# Patient Record
Sex: FEMALE | Race: WHITE | URBAN - METROPOLITAN AREA
[De-identification: names, ages, dates, MRNs, and addresses within clinical notes are randomized per-mention and may not be internally consistent; named-entity substitution may affect disease eponyms.]

---

## 2017-02-02 ENCOUNTER — TRANSFERRED RECORDS (OUTPATIENT)
Dept: HEALTH INFORMATION MANAGEMENT | Facility: CLINIC | Age: 57
End: 2017-02-02

## 2017-02-08 ENCOUNTER — TRANSFERRED RECORDS (OUTPATIENT)
Dept: HEALTH INFORMATION MANAGEMENT | Facility: CLINIC | Age: 57
End: 2017-02-08

## 2017-02-14 ENCOUNTER — TRANSFERRED RECORDS (OUTPATIENT)
Dept: HEALTH INFORMATION MANAGEMENT | Facility: CLINIC | Age: 57
End: 2017-02-14

## 2017-02-22 ENCOUNTER — OFFICE VISIT (OUTPATIENT)
Dept: NEUROSURGERY | Facility: CLINIC | Age: 57
End: 2017-02-22
Attending: NURSE PRACTITIONER
Payer: COMMERCIAL

## 2017-02-22 VITALS
TEMPERATURE: 98.8 F | WEIGHT: 197 LBS | SYSTOLIC BLOOD PRESSURE: 123 MMHG | HEART RATE: 82 BPM | DIASTOLIC BLOOD PRESSURE: 83 MMHG | HEIGHT: 63 IN | OXYGEN SATURATION: 96 % | BODY MASS INDEX: 34.91 KG/M2

## 2017-02-22 DIAGNOSIS — M54.16 LUMBAR RADICULOPATHY: Primary | ICD-10-CM

## 2017-02-22 PROCEDURE — 99211 OFF/OP EST MAY X REQ PHY/QHP: CPT

## 2017-02-22 PROCEDURE — 99203 OFFICE O/P NEW LOW 30 MIN: CPT | Performed by: NURSE PRACTITIONER

## 2017-02-22 NOTE — NURSING NOTE
Evelia Conner is a 56 year old female who presents for:  Chief Complaint   Patient presents with     Neurologic Problem     buldging lumbar disc, low back pain with left leg numbness        Initial Vitals:  There were no vitals taken for this visit. There is no height or weight on file to calculate BMI.. There is no height or weight on file to calculate BSA. BP completed using cuff size: large  Data Unavailable    Do you feel safe in your environment?  Yes  Do you need any refills today? No    Nursing Comments: buldging lumbar disc, low back pain with left leg numbness.  Patient rates her pain today as 4 numbness, weakness, tingling and pain in the back, hips and thigh on left side      5 min. nursing intake time  Marline Crabtree CMA, AAS      Discharge plan:   Schedule lumbar epidural with [facility]- someone will contact you within 48 hours  [facility] after injection to follow up with Dr. Betts- [phone]      2 min. nursing discharge time  Marline Crabtree CMA, AAS

## 2017-02-22 NOTE — MR AVS SNAPSHOT
After Visit Summary   2/22/2017    Evelia Conner    MRN: [id number]           Patient Information     Date Of Birth          1960        Visit Information        Provider Department      2/22/2017 1:10 PM Anne-Marie Dalal NP [facility]        Today's Diagnoses     Lumbar radiculopathy    -  1      Care Instructions    Schedule lumbar epidural with [facility]- someone will contact you within 48 hours  [facility] after injection to follow up with Dr. Betts- [phone]          Follow-ups after your visit        Additional Services     [facility] ([place]) REFERRAL       Your provider has referred you to the [facility].    Reason for Referral: Procedure or injection only - patient will be contacted within 24 hrs to schedule: left L3 transforaminal epidural steroid injection     Please complete the following questions:    What is your diagnosis for the patient's pain? Lumbar radiculopathy    Do you have any specific questions for the pain specialist? No    Are there any red flags that may impact the assessment or management of the patient? None    **ANY DIAGNOSTIC TESTS THAT ARE NOT IN EPIC SHOULD BE SENT TO THE PAIN CENTER**    Please note the Pre-Op Pain Consult must be scheduled 2-3 weeks prior to the patient's surgery.  Patient's trying to schedule within 2 weeks of surgery may not be accommodated.     Pre-Op Pain Consults are only good for 30 days.    REGARDING OPIOID MEDICATIONS:  We will always address appropriateness of opioid pain medications, but we generally will not automatically take on a prescribing role. When we do take on prescribing of opioids for chronic pain, it is in collaboration with the referring physician for an intermediate period of time (months), with an expectation that the primary physician or provider will assume the prescribing role if  "medications are effective at stable doses with demonstrated compliance.  Therefore, please do not assume that your prescribing responsibilities end on the day of pain clinic consultation.  Is this agreeable to you? YES    For any questions, contact the [facility] at [phone].    Please be aware that coverage of these services is subject to the terms and limitations of your health insurance plan.  Call member services at your health plan with any benefit or coverage questions.      Please bring the following with you to your appointment:    (1) Any X-Rays, CTs or MRIs which have been performed.  Contact the facility where they were done to arrange for  prior to your scheduled appointment.    (2) List of current medications   (3) This referral request   (4) Any documents/labs given to you for this referral                  Who to contact     If you have questions or need follow up information about today's clinic visit or your schedule please contact [facility] directly at [phone].  Normal or non-critical lab and imaging results will be communicated to you by MyChart, letter or phone within 4 business days after the clinic has received the results. If you do not hear from us within 7 days, please contact the clinic through [employer] or phone. If you have a critical or abnormal lab result, we will notify you by phone as soon as possible.  Submit refill requests through [employer] or call your pharmacy and they will forward the refill request to us. Please allow 3 business days for your refill to be completed.          Additional Information About Your Visit        [employer] Information     [employer] lets you send messages to your doctor, view your test results, renew your prescriptions, schedule appointments and more. To sign up, go to [email or web address] . Click on \"Log in\" on the left side of the screen, which will take you to the Welcome page. Then click " "on \"Sign up Now\" on the right side of the page.     You will be asked to enter the access code listed below, as well as some personal information. Please follow the directions to create your username and password.     Your access code is: [email or web address]  Expires: 2017  1:55 PM     Your access code will  in 90 days. If you need help or a new code, please call your [facility] clinic or [phone].        Care EveryWhere ID     This is your Care EveryWhere ID. This could be used by other organizations to access your [facility] medical records  [id number]        Your Vitals Were     Pulse Temperature Height Pulse Oximetry Breastfeeding? BMI (Body Mass Index)    82 98.8  F (37.1  C) (Oral) 5' 2.5\" (1.588 m) 96% No 35.46 kg/m2       Blood Pressure from Last 3 Encounters:   17 123/83    Weight from Last 3 Encounters:   17 197 lb (89.4 kg)              We Performed the Following     PAIN MANAGEMENT CENTER ([place]) REFERRAL        Primary Care Provider Office Phone # Fax #    Jill Taylor [phone]       [facility]  [address] [place] [address]        Thank you!     Thank you for choosing [facility]  for your care. Our goal is always to provide you with excellent care. Hearing back from our patients is one way we can continue to improve our services. Please take a few minutes to complete the written survey that you may receive in the mail after your visit with us. Thank you!             Your Updated Medication List - Protect others around you: Learn how to safely use, store and throw away your medicines at www.disposemymeds.org.          This list is accurate as of: 17  1:55 PM.  Always use your most recent med list.                   Brand Name Dispense Instructions for use    HYDROCHLOROTHIAZIDE PO      Take 25 mg by mouth       OMEPRAZOLE PO      Take 20 mg by mouth         "

## 2017-02-22 NOTE — PROGRESS NOTES
"Dr. El Betts  [facility]  Neurosurgery Clinic Visit    CC: Low back and left leg pain.    Primary care Provider: Jill Taylor      Reason For Visit:   I was asked by Dr. Jill Taylor to evaluate the patient for low back and leg pain.      HPI: Evelia Conner is a 56 year old female with low back pain and left lower extremity pain.  She states her symptoms started about six weeks ago with mostly left sided low back pain  She stared to have pain radiating into the left hip and buttock and wrapping around along the left anterior thigh.  She states the pain is constant and is generally a sharp pain.  She has numbness that extends along the left shin and to the top of the left foot.  Her pain increases with most activities including standing, bending, walking and lifting.  She finds some relief while lying down but has to change position frequently.  She denies weakness to lower extremities, \"Just pain.\"  She denies changes to bowel or bladder.  She has had one LESI at [facility] but does not feel it was helpful (L4-5 MEGAN).      Pain at its worst 8+  Pain right now:  4    History reviewed. No pertinent past medical history.    Past Medical History reviewed with patient during visit.    History reviewed. No pertinent past surgical history.  Past Surgical History reviewed with patient during visit.    Current Outpatient Prescriptions   Medication     OMEPRAZOLE PO     HYDROCHLOROTHIAZIDE PO     No current facility-administered medications for this visit.        No Known Allergies    Social History     Social History     Marital status:      Spouse name: N/A     Number of children: N/A     Years of education: N/A     Social History Main Topics     Smoking status: Never Smoker     Smokeless tobacco: None     Alcohol use None     Drug use: None     Sexual activity: Not Asked     Other Topics Concern     None     Social History Narrative     None       History reviewed. No pertinent family " "history.      ROS: 10 point ROS neg other than the symptoms noted above in the HPI.    Vital Signs: /83 (BP Location: Right arm, Cuff Size: Adult Large)  Pulse 82  Temp 98.8  F (37.1  C) (Oral)  Ht 5' 2.5\" (1.588 m)  Wt 197 lb (89.4 kg)  SpO2 96%  Breastfeeding? No  BMI 35.46 kg/m2    Examination:  Constitutional:  Alert, well nourished, NAD.  HEENT: Normocephalic, atraumatic.   Pulm:  Without shortness of breath   CV:  No pitting edema of BLE.    Neurological:  Awake  Alert  Oriented x 3  Speech clear  Cranial nerves II - XII intact    Motor exam   Shoulder Abduction:  Right:  5/5   Left:  5/5  Biceps:                      Right:  5/5   Left:  5/5  Triceps:                     Right:  5/5   Left:  5/5  Wrist Extensors:       Right:  5/5   Left:  5/5  Wrist Flexors:           Right:  5/5   Left:  5/5  Intrinsics:                   Right:  5/5   Left:  5/5  Hip Flexor:                Right: 5/5  Left:  4+/5  Hip Adductor:             Right:  5/5  Left:  4+/5  Hip Abductor:             Right:  5/5  Left:  4+/5  Gastroc Soleus:        Right:  5/5  Left:  4+/5  Tib/Ant:                      Right:  5/5  Left:  4+/5  EHL:                          Right:  5/5  Left:  4+/5   Sensation normal to bilateral upper and lower extremities    Gait: Able to stand from a seated position, slowly. Normal non-antalgic, non-myelopathic gait.  Difficulty heel/toe walk without loss of balance due to pain.  Cervical examination reveals good range of motion.  No tenderness to palpation of the cervical spine or paraspinous muscles bilaterally.    Lumbar examination reveals tenderness of the spine and paraspinous muscles on the left.  Hip height is symmetrical. Negative SI joint, sciatic notch or greater trochanteric tenderness to palpation bilaterally.  Straight leg raise is posititive on the left.     Imaging: Lumbar MRI 2/2/17:  -L3-L4 left foraminal disc protrusion    Assessment/Plan:   Lumbar DDD  Lumbar Radiculopathy    Evelia " Dalila is a 56 year old female with low back pain and left lower extremity pain.  She states her symptoms started about six weeks ago with mostly left sided low back pain  She stared to have pain radiating into the left hip and buttock and wrapping around along the left anterior thigh. We reviewed MRI results and discussed recommendations for repeat LESI, transforaminal left at L3 level.  The patient had one previous injection at what sounds to be L4-5 level without benefit.  She will contact the clinic following the injection and schedule back with Dr. Betts to discuss surgical intervention if no relief with MEGAN.        Patient Instructions   Schedule lumbar epidural with [facility]- someone will contact you within 48 hours  Call [facility] after injection to follow up with Dr. Betts- [phone]        Anne-Marie Dalal [facility]  [facility]  [address] [address]    Tel [phone]  Pager [phone]

## 2017-02-22 NOTE — PATIENT INSTRUCTIONS
Schedule lumbar epidural with [facility]- someone will contact you within 48 hours  Call [facility] after injection to follow up with Dr. Betts- [phone]

## 2017-03-10 ENCOUNTER — RADIOLOGY INJECTION OFFICE VISIT (OUTPATIENT)
Dept: PALLIATIVE MEDICINE | Facility: CLINIC | Age: 57
End: 2017-03-10
Payer: COMMERCIAL

## 2017-03-10 ENCOUNTER — RADIANT APPOINTMENT (OUTPATIENT)
Dept: GENERAL RADIOLOGY | Facility: CLINIC | Age: 57
End: 2017-03-10
Attending: PAIN MEDICINE
Payer: COMMERCIAL

## 2017-03-10 VITALS — DIASTOLIC BLOOD PRESSURE: 82 MMHG | HEART RATE: 71 BPM | SYSTOLIC BLOOD PRESSURE: 126 MMHG | OXYGEN SATURATION: 98 %

## 2017-03-10 DIAGNOSIS — M54.16 LUMBAR RADICULITIS: ICD-10-CM

## 2017-03-10 DIAGNOSIS — M54.16 LUMBAR RADICULOPATHY: ICD-10-CM

## 2017-03-10 DIAGNOSIS — M47.817 LUMBOSACRAL SPONDYLOSIS WITHOUT MYELOPATHY: Primary | ICD-10-CM

## 2017-03-10 PROCEDURE — 64483 NJX AA&/STRD TFRM EPI L/S 1: CPT | Mod: LT | Performed by: PAIN MEDICINE

## 2017-03-10 ASSESSMENT — PAIN SCALES - GENERAL: PAINLEVEL: SEVERE PAIN (6)

## 2017-03-10 NOTE — NURSING NOTE
Discharge Information    IV Discontiued Time:  NA    Amount of Fluid Infused:  NA    Discharge Criteria = When patient returns to baseline or as per MD order    Consciousness:  Pt is fully awake    Circulation:  BP +/- 20% of pre-procedure level    Respiration:  Patient is able to breathe deeply    O2 Sat:  Patient is able to maintain O2 Sat >92% on room air    Activity:  Moves 4 extremities on command    Ambulation:  Patient is able to stand and walk or stand and pivot into wheelchair    Dressing:  Clean/dry or No Dressing    Notes:   Discharge instructions and AVS given to patient    Patient meets criteria for discharge?  YES    Admitted to PCU?  No    Responsible adult present to accompany patient home?  Yes    Signature/Title:    Rosalia Rodriguez  RN Care Coordinator  [facility]

## 2017-03-10 NOTE — NURSING NOTE
Injection intake:    If this procedure is requiring IV sedation has patient been NPO for 6  Hours? NA    Is patient on coumadin, plavix or other prescribed blood thinner?   No    If patient is on coumadin was it held for 5 days?   NA    If patient is on plavix was it held for 7 days?    NA     Does patient take aspirin?  No    If this is for a cervical procedure and patient is on aspirin has it been held for 6 days?   NA    Any allergies to contrast dye, iodine, steroid and/or numbing medications?  NO    Is patient currently taking antibiotics or have an active infection?  NO    Does patient have a ? Yes       Is patient pregnant or breastfeeding?  NO    Are the vital signs normal?  Yes      Makenna Mccauley [facility] [place]

## 2017-03-10 NOTE — MR AVS SNAPSHOT
After Visit Summary   3/10/2017    Evelia Conner    MRN: [id number]           Patient Information     Date Of Birth          1960        Visit Information        Provider Department      3/10/2017 10:45 AM Shanel Enamorado MD [facility]        Care Instructions    [facility] Procedure Discharge Instructions    Today you saw:     Dr. Shanel Enamorado     Your procedure:  Epidural steroid injection      Medications used:  Lidocaine (anesthetic)  Dexamethasone (steroid)  Omnipaque (contrast)            Be cautious when walking as numbness and/or weakness in the legs may occur up to 6-8 hours after the procedure due to effect of the local anesthetic    Do not drive for 6 hours. The effect of the local anesthetic could slow your reflexes.     Avoid strenuous activity for the first 24 hours. You may resume your regular activities after that.     You may shower, however avoid swimming, tub baths or hot tubs for 24 hours following your procedure    If you were fasting, you can resume your regular diet and medications    You may have a mild to moderate increase in pain for several days following the injection.      You may use ice packs for 10-15 minutes, 3 to 4 times a day at the injection site for comfort    Do not use heat to painful areas for 6 to 8 hours. This will give the local anesthetic time to wear off and prevent you from accidentally burning your skin.    You may use anti-inflammatory medications (such as Ibuprofen/Advil or Aleve) or Tylenol for pain control if necessary    If you have diabetes, check your blood sugar more frequently than usual as your blood sugar may be higher than normal for 10-14 days following a steroid injection. Contact your doctor who manages your diabetes if your blood sugar is higher than usual    It may take up to 14 days for the steroid medication to start working although you may feel the effect as early as a few days after the procedure.        If you experience any of the following, call the pain center nursing line during work hours at [phone] or on-call physician after hours at [phone]:  -Fever over 100 degree F  -Swelling, bleeding, redness, drainage, warmth at the injection site  -Progressive weakness or numbness in your legs or arms  -Loss of bowel or bladder function  -Unusual headache that is not relieved by Tylenol  -Unusual new onset of pain that is not improving    Phone #s:  Appointment scheduling line: [phone]          Follow-ups after your visit        Who to contact     If you have questions or need follow up information about today's clinic visit or your schedule please contact [facility] directly at [phone].  Normal or non-critical lab and imaging results will be communicated to you by [employer], letter or phone within 4 business days after the clinic has received the results. If you do not hear from us within 7 days, please contact the clinic through [employer] or phone. If you have a critical or abnormal lab result, we will notify you by phone as soon as possible.  Submit refill requests through [employer] or call your pharmacy and they will forward the refill request to us. Please allow 3 business days for your refill to be completed.          Additional Information About Your Visit        [employer] Information     [employer] gives you secure access to your electronic health record. If you see a primary care provider, you can also send messages to your care team and make appointments. If you have questions, please call your primary care clinic.  If you do not have a primary care provider, please call [phone] and they will assist you.        Care EveryWhere ID     This is your Care EveryWhere ID. This could be used by other organizations to access your [facility] medical records  [id number]        Your Vitals Were     Pulse Pulse Oximetry                66 98%           Blood Pressure from Last 3  Encounters:   03/10/17 127/76   02/22/17 123/83    Weight from Last 3 Encounters:   02/22/17 89.4 kg (197 lb)              Today, you had the following     No orders found for display       Primary Care Provider Office Phone # Fax #    Jill Taylor [phone]       [facility]  [address] [facility] [address]        Thank you!     Thank you for choosing [facility]  for your care. Our goal is always to provide you with excellent care. Hearing back from our patients is one way we can continue to improve our services. Please take a few minutes to complete the written survey that you may receive in the mail after your visit with us. Thank you!             Your Updated Medication List - Protect others around you: Learn how to safely use, store and throw away your medicines at www.disposemymeds.org.          This list is accurate as of: 3/10/17 11:07 AM.  Always use your most recent med list.                   Brand Name Dispense Instructions for use    HYDROCHLOROTHIAZIDE PO      Take 25 mg by mouth       OMEPRAZOLE PO      Take 20 mg by mouth

## 2017-03-10 NOTE — PATIENT INSTRUCTIONS
[facility] Procedure Discharge Instructions    Today you saw:     Dr. Shanel Enamorado     Your procedure:  Epidural steroid injection      Medications used:  Lidocaine (anesthetic)  Dexamethasone (steroid)  Omnipaque (contrast)            Be cautious when walking as numbness and/or weakness in the legs may occur up to 6-8 hours after the procedure due to effect of the local anesthetic    Do not drive for 6 hours. The effect of the local anesthetic could slow your reflexes.     Avoid strenuous activity for the first 24 hours. You may resume your regular activities after that.     You may shower, however avoid swimming, tub baths or hot tubs for 24 hours following your procedure    If you were fasting, you can resume your regular diet and medications    You may have a mild to moderate increase in pain for several days following the injection.      You may use ice packs for 10-15 minutes, 3 to 4 times a day at the injection site for comfort    Do not use heat to painful areas for 6 to 8 hours. This will give the local anesthetic time to wear off and prevent you from accidentally burning your skin.    You may use anti-inflammatory medications (such as Ibuprofen/Advil or Aleve) or Tylenol for pain control if necessary    If you have diabetes, check your blood sugar more frequently than usual as your blood sugar may be higher than normal for 10-14 days following a steroid injection. Contact your doctor who manages your diabetes if your blood sugar is higher than usual    It may take up to 14 days for the steroid medication to start working although you may feel the effect as early as a few days after the procedure.       If you experience any of the following, call the pain center nursing line during work hours at [phone] or on-call physician after hours at [phone]:  -Fever over 100 degree F  -Swelling, bleeding, redness, drainage, warmth at the injection site  -Progressive weakness or numbness in your  legs or arms  -Loss of bowel or bladder function  -Unusual headache that is not relieved by Tylenol  -Unusual new onset of pain that is not improving    Phone #s:  Appointment scheduling line: [phone]

## 2017-03-12 NOTE — PROGRESS NOTES
"[facility] - Procedure Note    Date of Visit: 03/10/17    Indications: Evelia Conner is a 56-year-old female who is seen at the referral of Anne-Marie Dalal NP for left L3 transforaminal epidural steroid injection. The patient describes pain across the low back and into the left \"hip\". Her pain radiates across the left anterior thigh into the knee. She reports numbness distal to the knee. She reports sense of weakness (\"give-out\"). She notes that she has more recently had symptoms beginning on the right side. She denies saddle anesthesia or bowel/bladder incontinence. The patient has undergone spinal injection in the past; she denies significant relief with her last injectio performed approximately 1 month prior.     Electronic Chart Review: Patient history, pertinent diagnostic studies, vitals, allergies, and medications were reviewed.    Review of Systems: The patient denies recent fever, chills, illness, use of antibiotics or anticoagulants. No allergy to local anesthetic, contrast dye, or steroid.     Physical Examination:   /82  Pulse 71  SpO2 98%  GEN: Alert. Well developed, well nourished. Mild distress secondary to pain.  CV/Resp: Symmetric chest wall excursion. Non-labored breathing. No audible wheezing.  Skin: No rashes/lesions of posterior torso.   Extremities: Distal extremities warm, well perfused.  Neuro/MSK: Power 5/5 throughout bilateral hip flexors, hip abductors/adductors, knee flexors/extensors, ADF, APF. Reduced endurance (give-way) on left.     Imagin17 MRI lumbar spine ([facility], report only): 1. Left leg pain is likely related to a left foraminal disc protrusion at L3-4 which could create left L3 radiculopathy. Slight narrowing of the left lateral recess at this level could affect the left L4 nerve root as well. Mild central canal narrowing. Moderate left foraminal narrowing. 2. Other lumbar spondylosis as detailed above. 3. At L5-S1, the " "central canal appears mildly, congenitally narrowed with mild narrowing of the left neural foramen. Minimal annual bulging abuts and slightly distorts the exiting right S1 nerve root. Correlate for right S1 radiculopathy.    Procedure Description:    Pre-procedure Diagnosis: Lumbar spondylosis; Lumbar radiculitis/radiculopathy  Post-procedure Diagnosis: Lumbar spondylosis; Lumbar radiculitis/radiculopathy  Procedure performed: Lumbar transforaminal epidural steroid injection  : Shanel Enamorado MD    Side/Nerve root level: Left L3-4  Local anesthetic: 4 mL 1% lidocaine (preservative free)  Medication solution (injectate): 2 mL of 10 mg/mL dexamethasone + 1 mL of 1% lidocaine (preservative free)  Contrast: 1 mL of Omnipaque 300 used, 9 mL discarded  Sterile prep/cleansing solution: ChloraPrep    The procedure and risks were explained, and informed written consent was obtained from the patient. Risks include but are not limited to: infection, bleeding, headache, increased pain, damage to soft tissue, nerve, muscle, and vasculature structures, paralysis, and stroke. Risks of steroid include but are not limited to: medication reaction, mood/sleep disturbance, increased blood pressure, increased blood glucose, effects on eye conditions (glaucoma/cataracts), effects on bone/tissue structure/health. The procedure site was marked using a disposable pen. Immediately prior to the start of the procedure, a \"time out\" safety check was conducted to confirm correct patient, procedure, and laterality. The patient's heart rate and oxygen saturation were monitored throughout the procedure.    The patient was positioned prone on the fluoroscopy table with a pillow under the abdomen to help reduce the lumbar lordosis. The skin was prepped and draped in sterile fashion. The appropriate vertebral levels were identified with fluoroscopy in AP view. Fluoroscopic imaging suggested possible transitional lumbosacral vertebra, although " "this was unable to be confirmed against outside imaging (no prior x-ray; MR images not available for review). The L3-4 level was identified by counting from below (lowest lumbarized level designated L5-S1); fluoroscopic images were reviewed with Dr. Georgina Vincent, who agreed with this labeling approach. An oblique fluoroscopic view was obtained, with the superior articular process of the inferior vertebral body aligned with the pedicle of the target level. With a 25-gauge, 1.5 inch needle, local anesthetic was injected subcutaneously over the entry site. Under intermitted fluoroscopic guidance, a 22-gauge, 5 inch Quincke spinal needle was advanced toward the 6 o'clock position of the target pedicle. The needle was advanced until the tip was inferior and slightly lateral to the target pedicle, just adjacent to the spinal nerve. Confirmation of proper needle position was made with AP, oblique, and lateral fluoroscopic views. After negative aspiration for heme and cerebrospinal fluid, 1 mL of non-ionic contrast was slowly injected. Fluoroscopic imaging revealed outline of the target spinal nerve, with proximal spread of agent through the neural foramen into the epidural space; there was no evidence of vascular uptake. Following negative aspiration, the medication solution was administered in increments. She reported good reproduction of symptoms. The stylet was reinserted, and the needle was withdrawn.    The patient tolerated the procedure well, and there was no evidence of procedural complications. No new sensory or motor deficits were noted following the procedure. The patient was stable and able to ambulate on discharge home. Post-procedure instructions were provided.     Pre-procedure pain score: 3/10 back pain, 6/10 \"hip\" pain, 5/10 leg pain  Post-procedure pain score: 1/10 back pain, 4/10 \"hip\"/leg pain    Assessment/Plan: Evelia Conner is a 56-year-old female s/p left L3-4 transforaminal epidural steroid " injection today.     1. Following today's procedure, the patient was advised to contact the [facility] for any of the following:   Fever, chills, or night sweats   New onset of pain, numbness, or weakness   Any questions/concerns regarding the procedure      If unable to contact the Pain Center, the patient was instructed to go to a local Emergency Room for any complications.   2. If only partial relief, consideration may be given towards transforaminal epidural steroid injection at left L2-3.   3. Follow-up with referring provider as directed for post-procedure evaluation.    Shanel Enamorado MD  [facility]

## 2017-03-20 ENCOUNTER — OFFICE VISIT (OUTPATIENT)
Dept: NEUROSURGERY | Facility: CLINIC | Age: 57
End: 2017-03-20
Attending: NEUROLOGICAL SURGERY
Payer: COMMERCIAL

## 2017-03-20 VITALS
HEART RATE: 96 BPM | DIASTOLIC BLOOD PRESSURE: 83 MMHG | BODY MASS INDEX: 35.64 KG/M2 | TEMPERATURE: 97.3 F | SYSTOLIC BLOOD PRESSURE: 126 MMHG | WEIGHT: 198 LBS | OXYGEN SATURATION: 97 %

## 2017-03-20 DIAGNOSIS — M51.9 DISORDER OF INTERVERTEBRAL DISC OF LUMBAR SPINE: Primary | ICD-10-CM

## 2017-03-20 PROCEDURE — 99213 OFFICE O/P EST LOW 20 MIN: CPT | Performed by: NEUROLOGICAL SURGERY

## 2017-03-20 NOTE — NURSING NOTE
Pre-operative Education    Education included but not limited to:  - Pre-operative physical with primary care physician within 30 days of surgical date.   - Pre-operative clearance (cardiology, hematology, etc).   - Discontinue Aspirin, NSAIDs, Diclofenac x 7 days prior to surgical date.   -Do not begin taking Non-Steroidal Anti-Inflammatory Drugs or NSAIDs (Advil,Motrin, Ibuprofen,Nuprin, Diclofenac,Meloxicam, Aleve, Celebrex, Aspirin, etc.) until 12 weeks after surgery if you had a fusion. May cause bleeding and interfere with bone healing.    -May try tylenol for pain 1000 mg three times per day for pain    -Per Dr. Betts, you may resume taking NSAIDs 7 days post op after microdiscectomy   -Patient is not a smoker  -Forms to be completed: 4 weeks     -Surgical risks: blood clots in the leg or lung, problems urinating, nerve damage, drainage from the incision, infection, stiffness.    -Preparation timeline   When to start NPO   Special bathing procedure.    -Hospital stay:   Checking in   Surgery   Recovery room   Hospital room     - Managing pain   - Likely same day procedure with discharge home day of surgery, may stay for 23 hour observation hospitalization for monitoring  - Post operative incisional pain x 1-2 weeks which will require pain medications and muscle relaxants.   -Do NOT drive while taking narcotic pain medication.  -Post operative incision care:   Keep your incision clean and dry at all times. OK to remove dressing on postop day 2. OK to shower on postop day 3 and allow water to run over incision, pat dry after shower.    No bathing, swimming or submerging in water. Call immediately or come to ED if any drainage occurs, or you develop new pain.   Watch for signs of infection: redness, swelling, warmth, drainage, and fever of 101 degrees or higher. Notify clinic.   - Post operative activity limitations: 6-8 weeks, no lifting > 10 pounds, no bending, twisting, or overhead reaching.  -If a brace is  required per Dr. Betts, [facility] will fit you for the brace in the hospital. Brace type and length of time to wear it will be determined by Dr. Betts.   - Follow up appointments: 6 weeks post op. Please call to schedule follow up appointment at [phone].   - Education book was also given to the patient for further review.      Patient verbalized understanding of above instructions. All questions were answered to the best of my ability and the patient's satisfaction. Patient advised to call with any additional questions or concerns.

## 2017-03-20 NOTE — PROGRESS NOTES
"HPI: Evelia Conner is a 56 year old female with low back pain and left lower extremity pain.  She states her symptoms started about six weeks ago with mostly left sided low back pain  She stared to have pain radiating into the left hip and buttock and wrapping around along the left anterior thigh.  She states the pain is constant and is generally a sharp pain.  She has numbness that extends along the left shin and to the top of the left foot.  Her pain increases with most activities including standing, bending, walking and lifting.  She finds some relief while lying down but has to change position frequently.  She denies weakness to lower extremities, \"Just pain.\"  She denies changes to bowel or bladder.  She has had one LESI at [facility] but does not feel it was helpful (L4-5 MEGAN).      Pain at its worst 8+  Pain right now:  4    Presents today for follow-up.  She underwent another steroid injection, and the pain has worsened substantially.  She has some left leg proximal weakness.  She has also done physical therapy without improvement.    No past medical history on file.    Past Medical History reviewed with patient during visit.    No past surgical history on file.  Past Surgical History reviewed with patient during visit.    Current Outpatient Prescriptions   Medication     OMEPRAZOLE PO     HYDROCHLOROTHIAZIDE PO     No current facility-administered medications for this visit.        No Known Allergies    Social History     Social History     Marital status:      Spouse name: N/A     Number of children: N/A     Years of education: N/A     Social History Main Topics     Smoking status: Never Smoker     Smokeless tobacco: None     Alcohol use None     Drug use: None     Sexual activity: Not Asked     Other Topics Concern     None     Social History Narrative     None       No family history on file.      ROS: 10 point ROS neg other than the symptoms noted above in the HPI.    Vital Signs: /83 (BP " Location: Right arm, Cuff Size: Adult Large)  Pulse 96  Temp 97.3  F (36.3  C) (Oral)  Wt 89.8 kg (198 lb)  SpO2 97%  BMI 35.64 kg/m2    Examination:  Constitutional:  Alert, well nourished, NAD.  HEENT: Normocephalic, atraumatic.   Pulm:  Without shortness of breath   CV:  No pitting edema of BLE.    Neurological:  Awake  Alert  Oriented x 3  Speech clear  Cranial nerves II - XII intact    Motor exam   Shoulder Abduction:  Right:  5/5   Left:  5/5  Biceps:                      Right:  5/5   Left:  5/5  Triceps:                     Right:  5/5   Left:  5/5  Wrist Extensors:       Right:  5/5   Left:  5/5  Wrist Flexors:           Right:  5/5   Left:  5/5  Intrinsics:                   Right:  5/5   Left:  5/5  Hip Flexor:                Right: 5/5  Left:  4+/5  Hip Adductor:             Right:  5/5  Left:  4+/5  Hip Abductor:             Right:  5/5  Left:  4+/5  Gastroc Soleus:        Right:  5/5  Left:  4+/5  Tib/Ant:                      Right:  5/5  Left:  4+/5  EHL:                          Right:  5/5  Left:  4+/5   Sensation normal to bilateral upper and lower extremities    Gait: Able to stand from a seated position, slowly. Normal non-antalgic, non-myelopathic gait.  Difficulty heel/toe walk without loss of balance due to pain.  Cervical examination reveals good range of motion.  No tenderness to palpation of the cervical spine or paraspinous muscles bilaterally.    Lumbar examination reveals tenderness of the spine and paraspinous muscles on the left.  Hip height is symmetrical. Negative SI joint, sciatic notch or greater trochanteric tenderness to palpation bilaterally.  Straight leg raise is posititive on the left.     Imaging: Lumbar MRI 2/2/17:  -L3-L4 left foraminal disc protrusion    Assessment/Plan:   Lumbar DDD  Lumbar Radiculopathy    56 year old female with left L3 4 disc herniation, left leg pain    Given her failure to improve with conservative management, we will plan for surgery  Risks and  benefits discussed in detail and she wishes to proceed  We'll plan for minimally invasive decompression and foraminotomy

## 2017-03-20 NOTE — NURSING NOTE
"Evelia Conner is a 56 year old female who presents for:  Chief Complaint   Patient presents with     Neurologic Problem     lumbar radiculopathy, f/u after injection 3-10-17        Initial Vitals:  /83 (BP Location: Right arm, Cuff Size: Adult Large)  Pulse 96  Temp 97.3  F (36.3  C) (Oral)  Wt 198 lb (89.8 kg)  SpO2 97%  BMI 35.64 kg/m2 Estimated body mass index is 35.64 kg/(m^2) as calculated from the following:    Height as of 2/22/17: 5' 2.5\" (1.588 m).    Weight as of this encounter: 198 lb (89.8 kg).. Body surface area is 1.99 meters squared. BP completed using cuff size: large  Data Unavailable    Do you feel safe in your environment?  Yes  Do you need any refills today? No    Nursing Comments: . lumbar radiculopathy, f/u after injection 3-10-17 Patient rates 7 pain today as 3/20/17      5 min. nursing intake time  Katelynn Fields CMA      Discharge plan: See doctor dictation  2 min. nursing discharge time  Katelynn Fields CMA         "

## 2017-03-20 NOTE — PATIENT INSTRUCTIONS
Surgery scheduled at [facility] for Left L3-4 Foraminotomy    Pre-Operative:  -Surgical risks: blood clots in the leg or lung, problems urinating, nerve damage, drainage from the incision, infection, stiffness  - Pre-operative physical with primary care physician within 30 days of surgical date.   -Stop all solid foods 8 hours before surgery.  -Keep drinking clear liquids until 4 hours before surgery. Clear liquids include water, clear juice, black coffee, or clear tea without milk, Gatorade, clear soda.   -Shower procedure: please shower with antibacterial soap the night before surgery and morning of surgery. Refer to information sheet in folder.   - Discontinue Aspirin, NSAIDs (Advil/Ibuprofen, Naproxen,Nuprin, Diclofenac,Meloxicam, Aleve, Celebrex) x 7 days prior to surgical date.  - May try Tylenol for pain 1000 mg three times per day for pain  -Per Dr. Betts, you may resume taking NSAIDs 7 days post op after microdiscectomy      Post-Operative:  -Hospital stay: likely same day procedure with discharge home day of surgery, may stay for 23 hour observation hospitalization for monitoring.  - Post operative incisional pain often lasts 1-2 weeks which will require pain medications and muscle relaxants. You will receive medication upon discharge.  -Do NOT drive while taking narcotic pain medication.  -Post operative incision care-    -Watch for signs of infection: redness, swelling, warmth, drainage, and fever of 101 degrees or higher. Notify clinic [phone].   -No submerging incision in water such as pools, hot tubs, baths for at least 8 weeks or until incision is healed. Showers are fine.   - Post operative activity limitations: 6-8 weeks, no lifting > 10 pounds, no bending, twisting, or overhead reaching.  -If you are currently employed, you will need to be off work for 2-4 weeks for post op recovery and healing.   - Follow up appointment: 6 weeks post operative. Please call to schedule follow  up appointment at [phone].

## 2017-03-20 NOTE — MR AVS SNAPSHOT
After Visit Summary   3/20/2017    Evelia Conner    MRN: [id number]           Patient Information     Date Of Birth          1960        Visit Information        Provider Department      3/20/2017 2:20 PM El Betts MD [facility]        Care Instructions    Surgery scheduled at [facility] for Left L3-4 Foraminotomy    Pre-Operative:  -Surgical risks: blood clots in the leg or lung, problems urinating, nerve damage, drainage from the incision, infection, stiffness  - Pre-operative physical with primary care physician within 30 days of surgical date.   -Stop all solid foods 8 hours before surgery.  -Keep drinking clear liquids until 4 hours before surgery. Clear liquids include water, clear juice, black coffee, or clear tea without milk, Gatorade, clear soda.   -Shower procedure: please shower with antibacterial soap the night before surgery and morning of surgery. Refer to information sheet in folder.   - Discontinue Aspirin, NSAIDs (Advil/Ibuprofen, Naproxen,Nuprin, Diclofenac,Meloxicam, Aleve, Celebrex) x 7 days prior to surgical date.  - May try Tylenol for pain 1000 mg three times per day for pain  -Per Dr. Betts, you may resume taking NSAIDs 7 days post op after microdiscectomy      Post-Operative:  -Hospital stay: likely same day procedure with discharge home day of surgery, may stay for 23 hour observation hospitalization for monitoring.  - Post operative incisional pain often lasts 1-2 weeks which will require pain medications and muscle relaxants. You will receive medication upon discharge.  -Do NOT drive while taking narcotic pain medication.  -Post operative incision care-    -Watch for signs of infection: redness, swelling, warmth, drainage, and fever of 101 degrees or higher. Notify clinic [phone].   -No submerging incision in water such as pools, hot tubs, baths for at least 8 weeks or until incision is healed. Showers are fine.    - Post operative activity limitations: 6-8 weeks, no lifting > 10 pounds, no bending, twisting, or overhead reaching.  -If you are currently employed, you will need to be off work for 2-4 weeks for post op recovery and healing.   - Follow up appointment: 6 weeks post operative. Please call to schedule follow up appointment at [phone].                Follow-ups after your visit        Who to contact     If you have questions or need follow up information about today's clinic visit or your schedule please contact [facility] directly at [phone].  Normal or non-critical lab and imaging results will be communicated to you by Uanbaihart, letter or phone within 4 business days after the clinic has received the results. If you do not hear from us within 7 days, please contact the clinic through ironSourcet or phone. If you have a critical or abnormal lab result, we will notify you by phone as soon as possible.  Submit refill requests through Composeright or call your pharmacy and they will forward the refill request to us. Please allow 3 business days for your refill to be completed.          Additional Information About Your Visit        Uanbaihart Information     Composeright gives you secure access to your electronic health record. If you see a primary care provider, you can also send messages to your care team and make appointments. If you have questions, please call your primary care clinic.  If you do not have a primary care provider, please call [phone] and they will assist you.        Care EveryWhere ID     This is your Care EveryWhere ID. This could be used by other organizations to access your [facility] medical records  [id number]        Your Vitals Were     Pulse Temperature Pulse Oximetry BMI (Body Mass Index)          96 97.3  F (36.3  C) (Oral) 97% 35.64 kg/m2         Blood Pressure from Last 3 Encounters:   03/20/17 126/83   03/10/17 126/82   02/22/17 123/83    Weight from Last 3  Encounters:   03/20/17 198 lb (89.8 kg)   02/22/17 197 lb (89.4 kg)              Today, you had the following     No orders found for display       Primary Care Provider Office Phone # Fax #    Jill Taylor [phone]       [facility]  [address] [address]        Thank you!     Thank you for choosing [facility]  for your care. Our goal is always to provide you with excellent care. Hearing back from our patients is one way we can continue to improve our services. Please take a few minutes to complete the written survey that you may receive in the mail after your visit with us. Thank you!             Your Updated Medication List - Protect others around you: Learn how to safely use, store and throw away your medicines at www.disposemymeds.org.          This list is accurate as of: 3/20/17  2:35 PM.  Always use your most recent med list.                   Brand Name Dispense Instructions for use    HYDROCHLOROTHIAZIDE PO      Take 25 mg by mouth       OMEPRAZOLE PO      Take 20 mg by mouth

## 2017-03-21 ENCOUNTER — DOCUMENTATION ONLY (OUTPATIENT)
Dept: NEUROSURGERY | Facility: CLINIC | Age: 57
End: 2017-03-21

## 2017-03-21 ENCOUNTER — TELEPHONE (OUTPATIENT)
Dept: NEUROSURGERY | Facility: CLINIC | Age: 57
End: 2017-03-21

## 2017-03-21 ENCOUNTER — TELEPHONE (OUTPATIENT)
Dept: PALLIATIVE MEDICINE | Facility: CLINIC | Age: 57
End: 2017-03-21

## 2017-03-21 NOTE — TELEPHONE ENCOUNTER
Patient had a lumbar epidural injection on 03/10/17.  Called patient for an update.      Left message that we were calling for an update about how s/he was doing after the injection.  LM that if s/he has any problems or questions to call the nurse line at [phone].       Suzie JACOB)

## 2017-03-21 NOTE — TELEPHONE ENCOUNTER
Surgery Scheduled    Date of Surgery 4/12/17 Time of Surgery   Procedure: Left L3-4 foraminotomy  Hospital/Surgical Facility: [facility]  Surgeon: Dr. Betts  Type of Anesthesia : General  Pre-op patient states that she scheduled with PCP in [place]. I gave her fax number to send preop to.   Post op:5/25/17 with Dr. Betts        Surgery packet mailed to patient's home address. Patients instructed to arrive 1 hours prior to surgery. Patient understood and agrees to plan.    Roseann Arroyo  Surgery Scheduler

## 2017-03-21 NOTE — PROGRESS NOTES
Certification of Health Care provider for employee's serious health condition (FMLA)  Attention Shala Martino Faxed to [phone]

## 2017-03-27 ENCOUNTER — TRANSFERRED RECORDS (OUTPATIENT)
Dept: HEALTH INFORMATION MANAGEMENT | Facility: CLINIC | Age: 57
End: 2017-03-27

## 2017-04-11 ENCOUNTER — ANESTHESIA EVENT (OUTPATIENT)
Dept: SURGERY | Facility: CLINIC | Age: 57
End: 2017-04-11
Payer: COMMERCIAL

## 2017-04-12 ENCOUNTER — HOSPITAL ENCOUNTER (OUTPATIENT)
Facility: CLINIC | Age: 57
Discharge: HOME | End: 2017-04-12
Attending: NEUROLOGICAL SURGERY | Admitting: NEUROLOGICAL SURGERY
Payer: COMMERCIAL

## 2017-04-12 ENCOUNTER — ANESTHESIA (OUTPATIENT)
Dept: SURGERY | Facility: CLINIC | Age: 57
End: 2017-04-12
Payer: COMMERCIAL

## 2017-04-12 ENCOUNTER — HOSPITAL ENCOUNTER (OUTPATIENT)
Dept: GENERAL RADIOLOGY | Facility: CLINIC | Age: 57
End: 2017-04-12
Attending: NEUROLOGICAL SURGERY | Admitting: NEUROLOGICAL SURGERY
Payer: COMMERCIAL

## 2017-04-12 VITALS
DIASTOLIC BLOOD PRESSURE: 63 MMHG | HEART RATE: 58 BPM | OXYGEN SATURATION: 96 % | TEMPERATURE: 97.5 F | RESPIRATION RATE: 20 BRPM | SYSTOLIC BLOOD PRESSURE: 110 MMHG

## 2017-04-12 DIAGNOSIS — Z98.890 STATUS POST DISCECTOMY: Primary | ICD-10-CM

## 2017-04-12 DIAGNOSIS — M54.16 LUMBAR RADICULOPATHY: ICD-10-CM

## 2017-04-12 PROCEDURE — 36000065 ZZH SURGERY LEVEL 4 W FLUORO 1ST 30 MIN: Performed by: NEUROLOGICAL SURGERY

## 2017-04-12 PROCEDURE — 25000125 ZZHC RX 250: Performed by: NURSE ANESTHETIST, CERTIFIED REGISTERED

## 2017-04-12 PROCEDURE — 71000014 ZZH RECOVERY PHASE 1 LEVEL 2 FIRST HR: Performed by: NEUROLOGICAL SURGERY

## 2017-04-12 PROCEDURE — 40000306 ZZH STATISTIC PRE PROC ASSESS II: Performed by: NEUROLOGICAL SURGERY

## 2017-04-12 PROCEDURE — 25000128 H RX IP 250 OP 636: Performed by: NURSE ANESTHETIST, CERTIFIED REGISTERED

## 2017-04-12 PROCEDURE — 71000015 ZZH RECOVERY PHASE 1 LEVEL 2 EA ADDTL HR: Performed by: NEUROLOGICAL SURGERY

## 2017-04-12 PROCEDURE — 25000128 H RX IP 250 OP 636: Performed by: PHYSICIAN ASSISTANT

## 2017-04-12 PROCEDURE — 40000277 XR SURGERY CARM FLUORO LESS THAN 5 MIN W STILLS

## 2017-04-12 PROCEDURE — 63030 LAMOT DCMPRN NRV RT 1 LMBR: CPT | Mod: AS | Performed by: PHYSICIAN ASSISTANT

## 2017-04-12 PROCEDURE — 25000566 ZZH SEVOFLURANE, EA 15 MIN: Performed by: NEUROLOGICAL SURGERY

## 2017-04-12 PROCEDURE — 71000027 ZZH RECOVERY PHASE 2 EACH 15 MINS: Performed by: NEUROLOGICAL SURGERY

## 2017-04-12 PROCEDURE — 25800025 ZZH RX 258: Performed by: NURSE ANESTHETIST, CERTIFIED REGISTERED

## 2017-04-12 PROCEDURE — 37000009 ZZH ANESTHESIA TECHNICAL FEE, EACH ADDTL 15 MIN: Performed by: NEUROLOGICAL SURGERY

## 2017-04-12 PROCEDURE — 25000132 ZZH RX MED GY IP 250 OP 250 PS 637: Performed by: NEUROLOGICAL SURGERY

## 2017-04-12 PROCEDURE — 27210995 ZZH RX 272: Performed by: NEUROLOGICAL SURGERY

## 2017-04-12 PROCEDURE — 36000063 ZZH SURGERY LEVEL 4 EA 15 ADDTL MIN: Performed by: NEUROLOGICAL SURGERY

## 2017-04-12 PROCEDURE — C1894 INTRO/SHEATH, NON-LASER: HCPCS | Performed by: NEUROLOGICAL SURGERY

## 2017-04-12 PROCEDURE — 63030 LAMOT DCMPRN NRV RT 1 LMBR: CPT | Performed by: NEUROLOGICAL SURGERY

## 2017-04-12 PROCEDURE — 25000125 ZZHC RX 250: Performed by: NEUROLOGICAL SURGERY

## 2017-04-12 PROCEDURE — 27210794 ZZH OR GENERAL SUPPLY STERILE: Performed by: NEUROLOGICAL SURGERY

## 2017-04-12 PROCEDURE — 25000128 H RX IP 250 OP 636: Performed by: NEUROLOGICAL SURGERY

## 2017-04-12 PROCEDURE — 27110028 ZZH OR GENERAL SUPPLY NON-STERILE: Performed by: NEUROLOGICAL SURGERY

## 2017-04-12 PROCEDURE — 37000008 ZZH ANESTHESIA TECHNICAL FEE, 1ST 30 MIN: Performed by: NEUROLOGICAL SURGERY

## 2017-04-12 PROCEDURE — 27211024 ZZHC OR SUPPLY OTHER OPNP: Performed by: NEUROLOGICAL SURGERY

## 2017-04-12 RX ORDER — METOPROLOL TARTRATE 1 MG/ML
1-2 INJECTION, SOLUTION INTRAVENOUS EVERY 5 MIN PRN
Status: DISCONTINUED | OUTPATIENT
Start: 2017-04-12 | End: 2017-04-12 | Stop reason: HOSPADM

## 2017-04-12 RX ORDER — FENTANYL CITRATE 50 UG/ML
25-50 INJECTION, SOLUTION INTRAMUSCULAR; INTRAVENOUS
Status: DISCONTINUED | OUTPATIENT
Start: 2017-04-12 | End: 2017-04-12 | Stop reason: HOSPADM

## 2017-04-12 RX ORDER — ONDANSETRON 2 MG/ML
4 INJECTION INTRAMUSCULAR; INTRAVENOUS EVERY 30 MIN PRN
Status: DISCONTINUED | OUTPATIENT
Start: 2017-04-12 | End: 2017-04-12 | Stop reason: HOSPADM

## 2017-04-12 RX ORDER — ONDANSETRON 2 MG/ML
INJECTION INTRAMUSCULAR; INTRAVENOUS PRN
Status: DISCONTINUED | OUTPATIENT
Start: 2017-04-12 | End: 2017-04-12

## 2017-04-12 RX ORDER — METHYLPREDNISOLONE ACETATE 40 MG/ML
INJECTION, SUSPENSION INTRA-ARTICULAR; INTRALESIONAL; INTRAMUSCULAR; SOFT TISSUE PRN
Status: DISCONTINUED | OUTPATIENT
Start: 2017-04-12 | End: 2017-04-12 | Stop reason: HOSPADM

## 2017-04-12 RX ORDER — HYDROMORPHONE HYDROCHLORIDE 1 MG/ML
.3-.5 INJECTION, SOLUTION INTRAMUSCULAR; INTRAVENOUS; SUBCUTANEOUS
Status: DISCONTINUED | OUTPATIENT
Start: 2017-04-12 | End: 2017-04-12 | Stop reason: HOSPADM

## 2017-04-12 RX ORDER — FENTANYL CITRATE 50 UG/ML
INJECTION, SOLUTION INTRAMUSCULAR; INTRAVENOUS PRN
Status: DISCONTINUED | OUTPATIENT
Start: 2017-04-12 | End: 2017-04-12

## 2017-04-12 RX ORDER — BUPIVACAINE HYDROCHLORIDE AND EPINEPHRINE 5; 5 MG/ML; UG/ML
INJECTION, SOLUTION PERINEURAL PRN
Status: DISCONTINUED | OUTPATIENT
Start: 2017-04-12 | End: 2017-04-12 | Stop reason: HOSPADM

## 2017-04-12 RX ORDER — MEPERIDINE HYDROCHLORIDE 50 MG/ML
12.5 INJECTION INTRAMUSCULAR; INTRAVENOUS; SUBCUTANEOUS
Status: DISCONTINUED | OUTPATIENT
Start: 2017-04-12 | End: 2017-04-12 | Stop reason: HOSPADM

## 2017-04-12 RX ORDER — ACETAMINOPHEN 10 MG/ML
INJECTION, SOLUTION INTRAVENOUS PRN
Status: DISCONTINUED | OUTPATIENT
Start: 2017-04-12 | End: 2017-04-12

## 2017-04-12 RX ORDER — DIMENHYDRINATE 50 MG/ML
25 INJECTION, SOLUTION INTRAMUSCULAR; INTRAVENOUS
Status: DISCONTINUED | OUTPATIENT
Start: 2017-04-12 | End: 2017-04-12 | Stop reason: HOSPADM

## 2017-04-12 RX ORDER — NEOSTIGMINE METHYLSULFATE 1 MG/ML
VIAL (ML) INJECTION PRN
Status: DISCONTINUED | OUTPATIENT
Start: 2017-04-12 | End: 2017-04-12

## 2017-04-12 RX ORDER — SODIUM CHLORIDE, SODIUM LACTATE, POTASSIUM CHLORIDE, CALCIUM CHLORIDE 600; 310; 30; 20 MG/100ML; MG/100ML; MG/100ML; MG/100ML
INJECTION, SOLUTION INTRAVENOUS CONTINUOUS
Status: DISCONTINUED | OUTPATIENT
Start: 2017-04-12 | End: 2017-04-12 | Stop reason: HOSPADM

## 2017-04-12 RX ORDER — ONDANSETRON 4 MG/1
4 TABLET, ORALLY DISINTEGRATING ORAL EVERY 30 MIN PRN
Status: DISCONTINUED | OUTPATIENT
Start: 2017-04-12 | End: 2017-04-12 | Stop reason: HOSPADM

## 2017-04-12 RX ORDER — CEFAZOLIN SODIUM 2 G/100ML
2 INJECTION, SOLUTION INTRAVENOUS
Status: COMPLETED | OUTPATIENT
Start: 2017-04-12 | End: 2017-04-12

## 2017-04-12 RX ORDER — EPHEDRINE SULFATE 50 MG/ML
INJECTION, SOLUTION INTRAMUSCULAR; INTRAVENOUS; SUBCUTANEOUS PRN
Status: DISCONTINUED | OUTPATIENT
Start: 2017-04-12 | End: 2017-04-12

## 2017-04-12 RX ORDER — OXYCODONE HYDROCHLORIDE 5 MG/1
5-10 TABLET ORAL
Status: COMPLETED | OUTPATIENT
Start: 2017-04-12 | End: 2017-04-12

## 2017-04-12 RX ORDER — PROPOFOL 10 MG/ML
INJECTION, EMULSION INTRAVENOUS PRN
Status: DISCONTINUED | OUTPATIENT
Start: 2017-04-12 | End: 2017-04-12

## 2017-04-12 RX ORDER — NALOXONE HYDROCHLORIDE 0.4 MG/ML
.1-.4 INJECTION, SOLUTION INTRAMUSCULAR; INTRAVENOUS; SUBCUTANEOUS
Status: DISCONTINUED | OUTPATIENT
Start: 2017-04-12 | End: 2017-04-12 | Stop reason: HOSPADM

## 2017-04-12 RX ORDER — LIDOCAINE HYDROCHLORIDE 20 MG/ML
INJECTION, SOLUTION INFILTRATION; PERINEURAL PRN
Status: DISCONTINUED | OUTPATIENT
Start: 2017-04-12 | End: 2017-04-12

## 2017-04-12 RX ORDER — CEFAZOLIN SODIUM 1 G/3ML
1 INJECTION, POWDER, FOR SOLUTION INTRAMUSCULAR; INTRAVENOUS SEE ADMIN INSTRUCTIONS
Status: DISCONTINUED | OUTPATIENT
Start: 2017-04-12 | End: 2017-04-12 | Stop reason: HOSPADM

## 2017-04-12 RX ORDER — GLYCOPYRROLATE 0.2 MG/ML
INJECTION, SOLUTION INTRAMUSCULAR; INTRAVENOUS PRN
Status: DISCONTINUED | OUTPATIENT
Start: 2017-04-12 | End: 2017-04-12

## 2017-04-12 RX ORDER — DEXAMETHASONE SODIUM PHOSPHATE 10 MG/ML
INJECTION, SOLUTION INTRAMUSCULAR; INTRAVENOUS PRN
Status: DISCONTINUED | OUTPATIENT
Start: 2017-04-12 | End: 2017-04-12

## 2017-04-12 RX ORDER — LIDOCAINE 40 MG/G
CREAM TOPICAL
Status: DISCONTINUED | OUTPATIENT
Start: 2017-04-12 | End: 2017-04-12 | Stop reason: HOSPADM

## 2017-04-12 RX ORDER — OXYCODONE HYDROCHLORIDE 5 MG/1
5-10 TABLET ORAL
Qty: 60 TABLET | Refills: 0 | Status: SHIPPED | OUTPATIENT
Start: 2017-04-12 | End: 2017-05-25

## 2017-04-12 RX ADMIN — CEFAZOLIN SODIUM 2 G: 2 INJECTION, SOLUTION INTRAVENOUS at 13:02

## 2017-04-12 RX ADMIN — MIDAZOLAM HYDROCHLORIDE 1 MG: 1 INJECTION, SOLUTION INTRAMUSCULAR; INTRAVENOUS at 12:47

## 2017-04-12 RX ADMIN — FENTANYL CITRATE 50 MCG: 50 INJECTION, SOLUTION INTRAMUSCULAR; INTRAVENOUS at 12:51

## 2017-04-12 RX ADMIN — ROCURONIUM BROMIDE 20 MG: 10 INJECTION INTRAVENOUS at 13:50

## 2017-04-12 RX ADMIN — Medication 5 MG: at 14:03

## 2017-04-12 RX ADMIN — PROPOFOL 150 MG: 10 INJECTION, EMULSION INTRAVENOUS at 12:51

## 2017-04-12 RX ADMIN — FENTANYL CITRATE 50 MCG: 50 INJECTION, SOLUTION INTRAMUSCULAR; INTRAVENOUS at 15:16

## 2017-04-12 RX ADMIN — SUCCINYLCHOLINE CHLORIDE 20 MG: 20 INJECTION, SOLUTION INTRAMUSCULAR; INTRAVENOUS at 12:58

## 2017-04-12 RX ADMIN — LIDOCAINE HYDROCHLORIDE 1 ML: 10 INJECTION, SOLUTION EPIDURAL; INFILTRATION; INTRACAUDAL; PERINEURAL at 10:29

## 2017-04-12 RX ADMIN — ACETAMINOPHEN 1000 MG: 10 INJECTION, SOLUTION INTRAVENOUS at 14:20

## 2017-04-12 RX ADMIN — SODIUM CHLORIDE, POTASSIUM CHLORIDE, SODIUM LACTATE AND CALCIUM CHLORIDE: 600; 310; 30; 20 INJECTION, SOLUTION INTRAVENOUS at 10:29

## 2017-04-12 RX ADMIN — FENTANYL CITRATE 50 MCG: 50 INJECTION, SOLUTION INTRAMUSCULAR; INTRAVENOUS at 15:23

## 2017-04-12 RX ADMIN — ROCURONIUM BROMIDE 50 MG: 10 INJECTION INTRAVENOUS at 12:58

## 2017-04-12 RX ADMIN — FENTANYL CITRATE 50 MCG: 50 INJECTION, SOLUTION INTRAMUSCULAR; INTRAVENOUS at 15:03

## 2017-04-12 RX ADMIN — DEXAMETHASONE SODIUM PHOSPHATE 10 MG: 10 INJECTION, SOLUTION INTRAMUSCULAR; INTRAVENOUS at 13:02

## 2017-04-12 RX ADMIN — NEOSTIGMINE METHYLSULFATE 5 MG: 1 INJECTION INTRAMUSCULAR; INTRAVENOUS; SUBCUTANEOUS at 14:27

## 2017-04-12 RX ADMIN — FENTANYL CITRATE 50 MCG: 50 INJECTION, SOLUTION INTRAMUSCULAR; INTRAVENOUS at 12:49

## 2017-04-12 RX ADMIN — OXYCODONE HYDROCHLORIDE 5 MG: 5 TABLET ORAL at 16:23

## 2017-04-12 RX ADMIN — GLYCOPYRROLATE 0.8 MG: 0.2 INJECTION, SOLUTION INTRAMUSCULAR; INTRAVENOUS at 14:27

## 2017-04-12 RX ADMIN — MIDAZOLAM HYDROCHLORIDE 1 MG: 1 INJECTION, SOLUTION INTRAMUSCULAR; INTRAVENOUS at 12:49

## 2017-04-12 RX ADMIN — DEXAMETHASONE SODIUM PHOSPHATE 10 MG: 10 INJECTION, SOLUTION INTRAMUSCULAR; INTRAVENOUS at 13:12

## 2017-04-12 RX ADMIN — HYDROMORPHONE HYDROCHLORIDE 0.5 MG: 1 INJECTION, SOLUTION INTRAMUSCULAR; INTRAVENOUS; SUBCUTANEOUS at 15:31

## 2017-04-12 RX ADMIN — SODIUM CHLORIDE, POTASSIUM CHLORIDE, SODIUM LACTATE AND CALCIUM CHLORIDE: 600; 310; 30; 20 INJECTION, SOLUTION INTRAVENOUS at 13:00

## 2017-04-12 RX ADMIN — LIDOCAINE HYDROCHLORIDE 80 MG: 20 INJECTION, SOLUTION INFILTRATION; PERINEURAL at 12:56

## 2017-04-12 RX ADMIN — ONDANSETRON 4 MG: 2 INJECTION INTRAMUSCULAR; INTRAVENOUS at 13:12

## 2017-04-12 RX ADMIN — FENTANYL CITRATE 50 MCG: 50 INJECTION, SOLUTION INTRAMUSCULAR; INTRAVENOUS at 15:08

## 2017-04-12 RX ADMIN — FENTANYL CITRATE 50 MCG: 50 INJECTION, SOLUTION INTRAMUSCULAR; INTRAVENOUS at 13:24

## 2017-04-12 RX ADMIN — LIDOCAINE HYDROCHLORIDE 100 MG: 20 INJECTION, SOLUTION INFILTRATION; PERINEURAL at 14:28

## 2017-04-12 RX ADMIN — SODIUM CHLORIDE, POTASSIUM CHLORIDE, SODIUM LACTATE AND CALCIUM CHLORIDE: 600; 310; 30; 20 INJECTION, SOLUTION INTRAVENOUS at 15:18

## 2017-04-12 NOTE — IP AVS SNAPSHOT
[facility]    [address] [place] [address]    Phone:  [phone]                                       After Visit Summary   4/12/2017    Evelia Conner    MRN: [id number]           After Visit Summary Signature Page     I have received my discharge instructions, and my questions have been answered. I have discussed any challenges I see with this plan with the nurse or doctor.    ..........................................................................................................................................  Patient/Patient Representative Signature      ..........................................................................................................................................  Patient Representative Print Name and Relationship to Patient    ..................................................               ................................................  Date                                            Time    ..........................................................................................................................................  Reviewed by Signature/Title    ...................................................              ..............................................  Date                                                            Time

## 2017-04-12 NOTE — ANESTHESIA PREPROCEDURE EVALUATION
Anesthesia Evaluation     . Pt has had prior anesthetic. Type: General and MAC    No history of anesthetic complications          ROS/MED HX    ENT/Pulmonary:     (+)Intermittent asthma Last exacerbation: no inhaler X 2 month,Treatment: Inhaler prn,  , . .    Neurologic:  - neg neurologic ROS     Cardiovascular:     (+) hypertension-range: Borderline, ---. : . . . :. . No previous cardiac testing       METS/Exercise Tolerance:     Hematologic:  - neg hematologic  ROS       Musculoskeletal:   (+) , , other musculoskeletal- Left leg pain/numbness      GI/Hepatic:     (+) GERD Asymptomatic on medication,       Renal/Genitourinary:  - ROS Renal section negative       Endo:  - neg endo ROS       Psychiatric:  - neg psychiatric ROS       Infectious Disease:  - neg infectious disease ROS       Malignancy:      - no malignancy   Other:    - neg other ROS                 Physical Exam  Normal systems: cardiovascular, pulmonary and dental    Airway   Mallampati: II  TM distance: >3 FB  Neck ROM: full    Dental     Cardiovascular   Rhythm and rate: regular and normal      Pulmonary    breath sounds clear to auscultation                    Anesthesia Plan      History & Physical Review  History and physical reviewed and following examination; no interval change.    ASA Status:  2 .    NPO Status:  > 8 hours    Plan for General and ETT with Intravenous and Propofol induction. Maintenance will be Balanced.    PONV prophylaxis:  Ondansetron (or other 5HT-3) and Dexamethasone or Solumedrol       Postoperative Care  Postoperative pain management:  IV analgesics and Oral pain medications.      Consents                          .

## 2017-04-12 NOTE — ANESTHESIA CARE TRANSFER NOTE
Patient: Evelia Conner    Procedure(s):  left 3-4 lumbar foraminotomy minimally invasive - Wound Class: I-Clean    Diagnosis: lumbar radiculopathy  Diagnosis Additional Information: No value filed.    Anesthesia Type:   General, ETT     Note:  Airway :Face Mask  Patient transferred to:PACU        Vitals: (Last set prior to Anesthesia Care Transfer)    CRNA VITALS  4/12/2017 1408 - 4/12/2017 1446      4/12/2017             Pulse: 81    SpO2: 99 %                Electronically Signed By: LEANDRO Mead CRNA  April 12, 2017  2:46 PM

## 2017-04-12 NOTE — ANESTHESIA POSTPROCEDURE EVALUATION
Patient: Eevlia Conner    Procedure(s):  left 3-4 lumbar foraminotomy minimally invasive - Wound Class: I-Clean    Diagnosis:lumbar radiculopathy  Diagnosis Additional Information: No value filed.    Anesthesia Type:  General, ETT    Note:  Anesthesia Post Evaluation    Patient location during evaluation: Phase 2 and Bedside  Patient participation: Able to fully participate in evaluation  Level of consciousness: awake and alert  Pain management: satisfactory to patient  Airway patency: patent  Cardiovascular status: stable  Respiratory status: spontaneous ventilation and room air  Hydration status: stable  PONV: none     Anesthetic complications: None    Comments: Appear to tolerate Gen well without anesthesia related problems / complications noted.  Pain level satisfactory per patient. No N  /  V.  No complaints per patient.  Will follow as needed.        Last vitals:  Vitals:    04/12/17 1535 04/12/17 1540 04/12/17 1545   BP: 119/71 122/76 133/72   Resp: 12 15 20   Temp:      SpO2: 100% 97% 93%         Electronically Signed By: LEANDRO Mead CRNA  April 12, 2017  4:51 PM

## 2017-04-12 NOTE — IP AVS SNAPSHOT
MRN:[id number]                      After Visit Summary   4/12/2017    Evelia Conner    MRN: [id number]           Thank you!     Thank you for choosing [facility] for your care. Our goal is always to provide you with excellent care. Hearing back from our patients is one way we can continue to improve our services. Please take a few minutes to complete the written survey that you may receive in the mail after you visit with us. Thank you!        Patient Information     Date Of Birth          1960        About your hospital stay     You were admitted on:  April 12, 2017 You last received care in the:  [facility]    You were discharged on:  April 12, 2017       Who to Call     For medical emergencies, please call 911.  For non-urgent questions about your medical care, please call your primary care provider or clinic, [phone]  For questions related to your surgery, please call your surgery clinic        Attending Provider     Provider Specialty    El Betts MD Neurosurgery       Primary Care Provider Office Phone # Fax #    Jill Taylor [phone]       [facility]  [address] [place] [id number]        After Care Instructions     Discharge Instructions       Review outpatient procedure discharge instructions as directed by Provider.            Discharge Instructions        Diet as tolerated. Return to diet before surgery.            Discharge Instructions - Lifting Limit (specify)       Lifting limit of  10 pounds until seen at Post-op follow up appointment.            No Aspirin, Ibuprofen or Naproxen products        for 7 - 10 days post surgery            Notify Provider       Signs and symptoms of infection: Fever greater than 101, redness, swelling, heat at site, drainage, or pus            Remove dressing - 48 hours           Return to Clinic       Return to Clinic in 6 weeks  Follow up with López Lopez PA-C or Yue  Tran GALAN in 6 weeks for f/u            Shower - Do not soak           Wound care       Do not immerse wound in water until sutures removed                  Your next 10 appointments already scheduled     May 25, 2017  1:20 PM CDT   Return Visit with Yue Mayfield PA-C   [facility]    [address] [place] [id number]   [phone]              Further instructions from your care team       After you remove your dressing in 48 hours, you may shower, but don't soak in tub, hot tub or go swimming until after your follow up appointment.  Leave the steri strips in place.  You may cover your incision with gauze and tape if you want, or you can leave the dressing off after the first 48 hours.    You may use ice for comfort, 15 minutes out of every hour.  [facility]   Adult Discharge Orders & Instructions- After anesthesia    For 24 hours after surgery    1. Get plenty of rest.  A responsible adult must stay with you for at least 24 hours after you leave the hospital.   2. Do not drive or use heavy equipment.  If you have weakness or tingling, don't drive or use heavy equipment until this feeling goes away.  3. Do not drink alcohol.  4. Avoid strenuous or risky activities.  Ask for help when climbing stairs.   5. You may feel lightheaded.  IF so, sit for a few minutes before standing.  Have someone help you get up.   6. If you have nausea (feel sick to your stomach): Drink only clear liquids such as apple juice, ginger ale, broth or 7-Up.  Rest may also help.  Be sure to drink enough fluids.  Move to a regular diet as you feel able.  7. You may have a slight fever. Call the doctor if your fever is over 100 F (37.7 C) (taken under the tongue) or lasts longer than 24 hours.  8. You may have a dry mouth, a sore throat, muscle aches or trouble sleeping.  These should go away after 24 hours.  9. Do not make important or legal decisions.   Call your  doctor for any of the followin.  Signs of infection (fever, growing tenderness at the surgery site, a large amount of drainage or bleeding, severe pain, foul-smelling drainage, redness, swelling).    2. It has been over 8 to 10 hours since surgery and you are still not able to urinate (pass water).    3.  Headache for over 24 hours.    4.  Numbness, tingling or weakness the day after surgery (if you had spinal anesthesia).  To contact a doctor, call [phone]    Pending Results     No orders found from 4/10/2017 to 2017.            Admission Information     Date & Time Provider Department Dept. Phone    2017 El Betts MD [facility] [phone]      Your Vitals Were     Blood Pressure Temperature Respirations Pulse Oximetry          133/72 97.5  F (36.4  C) (Axillary) 20 93%        MyChart Information     [employer] gives you secure access to your electronic health record. If you see a primary care provider, you can also send messages to your care team and make appointments. If you have questions, please call your primary care clinic.  If you do not have a primary care provider, please call [phone] and they will assist you.        Care EveryWhere ID     This is your Care EveryWhere ID. This could be used by other organizations to access your [facility] medical records  [id number]           Review of your medicines      START taking        Dose / Directions    oxyCODONE 5 MG IR tablet   Commonly known as:  ROXICODONE   Used for:  Status post discectomy        Dose:  5-10 mg   Take 1-2 tablets (5-10 mg) by mouth every 3 hours as needed for pain or other (Moderate to Severe)   Quantity:  60 tablet   Refills:  0         CONTINUE these medicines which have NOT CHANGED        Dose / Directions    HYDROCHLOROTHIAZIDE PO        Dose:  25 mg   Take 25 mg by mouth   Refills:  0       OMEPRAZOLE PO        Dose:  20 mg   Take 20 mg by mouth   Refills:  0       TYLENOL  PO        Dose:  500 mg   Take 500 mg by mouth every 6 hours as needed for mild pain or fever   Refills:  0            Where to get your medicines      Some of these will need a paper prescription and others can be bought over the counter. Ask your nurse if you have questions.     Bring a paper prescription for each of these medications     oxyCODONE 5 MG IR tablet                Protect others around you: Learn how to safely use, store and throw away your medicines at www.disposemymeds.org.             Medication List: This is a list of all your medications and when to take them. Check marks below indicate your daily home schedule. Keep this list as a reference.      Medications           Morning Afternoon Evening Bedtime As Needed    HYDROCHLOROTHIAZIDE PO   Take 25 mg by mouth                                OMEPRAZOLE PO   Take 20 mg by mouth                                oxyCODONE 5 MG IR tablet   Commonly known as:  ROXICODONE   Take 1-2 tablets (5-10 mg) by mouth every 3 hours as needed for pain or other (Moderate to Severe)   Last time this was given:  5 mg on 4/12/2017  4:23 PM                                TYLENOL PO   Take 500 mg by mouth every 6 hours as needed for mild pain or fever                                          More Information        Post-Op Tips: Back  At home, you play a major role in your recovery. Protect your back by moving safely and practicing good body mechanics. Also see your healthcare provider for follow-up visits.  Getting back into action    Take short, frequent walks each day. Ask your healthcare provider how long your walks should be and how often you should take them.    You may be able to drive or return to a desk job within weeks after surgery. If you do more active work, you may need to wait longer before going back.    Your healthcare provider will instruct you about a limit on the weight of items safe for you to lift the first few weeks after surgery, generally no more  than 5 to 10 pounds.    As your back heals, you may feel ready to have sex. Ask your healthcare provider to suggest positions that will keep your back safe during sex.  Keys to good body mechanics  Good body mechanics (how you move) help keep your back safe by putting the least amount of pressure on your spine. To help protect your back, follow these tips:    Always try to keep your ears, shoulders, and hips in line with each other.    When you move, tighten the muscles in your stomach to support your spine.    When you bend, bend at your hips and knees, not at your waist.    When you turn, do not twist your shoulders or waist. Instead, turn your whole body.  Talk with your healthcare provider about what exercise program is best for you. Always tell your healthcare provider if an exercise causes new or lasting pain, numbness or tingling.     6991-0184 [employer]. [address] [place] [address], [place] [address]. All rights reserved. This information is not intended as a substitute for professional medical care. Always follow your healthcare professional's instructions.

## 2017-04-12 NOTE — DISCHARGE INSTRUCTIONS
After you remove your dressing in 48 hours, you may shower, but don't soak in tub, hot tub or go swimming until after your follow up appointment.  Leave the steri strips in place.  You may cover your incision with gauze and tape if you want, or you can leave the dressing off after the first 48 hours.    You may use ice for comfort, 15 minutes out of every hour.  [facility]   Adult Discharge Orders & Instructions- After anesthesia    For 24 hours after surgery    1. Get plenty of rest.  A responsible adult must stay with you for at least 24 hours after you leave the hospital.   2. Do not drive or use heavy equipment.  If you have weakness or tingling, don't drive or use heavy equipment until this feeling goes away.  3. Do not drink alcohol.  4. Avoid strenuous or risky activities.  Ask for help when climbing stairs.   5. You may feel lightheaded.  IF so, sit for a few minutes before standing.  Have someone help you get up.   6. If you have nausea (feel sick to your stomach): Drink only clear liquids such as apple juice, ginger ale, broth or 7-Up.  Rest may also help.  Be sure to drink enough fluids.  Move to a regular diet as you feel able.  7. You may have a slight fever. Call the doctor if your fever is over 100 F (37.7 C) (taken under the tongue) or lasts longer than 24 hours.  8. You may have a dry mouth, a sore throat, muscle aches or trouble sleeping.  These should go away after 24 hours.  9. Do not make important or legal decisions.   Call your doctor for any of the followin.  Signs of infection (fever, growing tenderness at the surgery site, a large amount of drainage or bleeding, severe pain, foul-smelling drainage, redness, swelling).    2. It has been over 8 to 10 hours since surgery and you are still not able to urinate (pass water).    3.  Headache for over 24 hours.    4.  Numbness, tingling or weakness the day after surgery (if you had spinal anesthesia).  To contact a doctor,  call [phone]

## 2017-04-13 NOTE — OP NOTE
SURGEON:  El Betts MD      ASSISTANT:  López Lopez PA-C. (Note,  López Lopez was present for and assisted with the entire surgery and his role as assistant was crucial for his aid in positioning, exposure, suctioning, retraction and closure.)      PREOPERATIVE DIAGNOSIS:  Lumbar radiculopathy.      POSTOPERATIVE DIAGNOSIS:  Lumbar radiculopathy.      PROCEDURES:   1.  Left L3-L4 laminotomies, medial facetectomy, foraminotomy and microdiskectomy.   2.  Use of APImetrics METRx minimally invasive dilating tube system.   3.  Use of intraoperative microscope and fluoroscopy.      DATE OF PROCEDURE:  04/12/2017      ESTIMATED BLOOD LOSS:  25 cc.      INDICATIONS FOR PROCEDURE:  Ms. Evelia Conner is a 56-year-old female with a history of profound left anterior thigh pain.  MRI demonstrated a left paracentral L3-L4 disk herniation with significant stenosis in the lateral recess.  She underwent extensive nonoperative management with failure to improve.   Risks, benefits, indications and alternatives were discussed with the patient and her family in detail.  All their questions were answered and they wished to proceed with surgery.      DESCRIPTION OF SURGERY:  The patient was positioned prone on a Ben frame.  Sterile prepping and draping procedures were performed.  Antibiotics were administered and timeout was performed.        A 10 blade was used  to perform a left paramedian skin incision and the METRx dilating tube system was used to dock on the left L3 hemilamina.  A high speed drill was used to perform laminotomies on the left side at L3 and L4 and a medial facetectomy of L3-L4.  The Kerrison rongeur was used to remove the ligamentum flavum with exposure of the lateral thecal sac and traversing L4 nerve root.  The nerve root and the thecal sac were retracted medially.  Significant disk herniation was noted.  An annulotomy was performed and a limited diskectomy was performed.  Then, the nerve root and thecal sac  were subsequently noted to be decompressed.  Antibiotic irrigation was performed.  The nerve was lined with Depo-Medrol.  The fascial layer was closed with 0 Vicryl sutures.  The dermal layer was closed with 2-0 Vicryl sutures and the skin was closed with a running subcuticular stitch.  There were no intra-procedural complications.         MANUEL ESQUEDA MD             D: 2017 14:33   T: 2017 12:25   MT: [id number]      Name:     BUSTER LEES   MRN:      [id number]        Account:        [id number]   :      1960           Procedure Date: 2017      Document: [id number]

## 2017-04-14 ENCOUNTER — TELEPHONE (OUTPATIENT)
Dept: NEUROSURGERY | Facility: CLINIC | Age: 57
End: 2017-04-14

## 2017-04-14 NOTE — TELEPHONE ENCOUNTER
Patient had left L3-4 foraminotomy on 4/12/17 with Dr. Betts. Contacted patient for post op follow up. LVM, advised her to call back with questions or concerns.

## 2017-05-25 ENCOUNTER — OFFICE VISIT (OUTPATIENT)
Dept: NEUROSURGERY | Facility: CLINIC | Age: 57
End: 2017-05-25
Payer: COMMERCIAL

## 2017-05-25 VITALS — HEIGHT: 63 IN | WEIGHT: 203.3 LBS | BODY MASS INDEX: 36.02 KG/M2 | TEMPERATURE: 97 F

## 2017-05-25 DIAGNOSIS — Z98.890 S/P LUMBAR MICRODISCECTOMY: Primary | ICD-10-CM

## 2017-05-25 PROCEDURE — 99024 POSTOP FOLLOW-UP VISIT: CPT | Performed by: PHYSICIAN ASSISTANT

## 2017-05-25 RX ORDER — ERGOCALCIFEROL 1.25 MG/1
CAPSULE, LIQUID FILLED ORAL
Refills: 0 | COMMUNITY
Start: 2017-05-04

## 2017-05-25 RX ORDER — MONTELUKAST SODIUM 10 MG/1
TABLET ORAL
Refills: 2 | COMMUNITY
Start: 2017-03-17

## 2017-05-25 RX ORDER — LEVALBUTEROL TARTRATE 45 UG/1
AEROSOL, METERED ORAL
Refills: 11 | COMMUNITY
Start: 2016-11-01

## 2017-05-25 ASSESSMENT — PAIN SCALES - GENERAL: PAINLEVEL: NO PAIN (0)

## 2017-05-25 NOTE — PROGRESS NOTES
[facility]  Neurosurgery followup:    HPI: 6 weeks s/p left L3-4 foraminotomy. Doing well. Radicular symptoms have completely resolved. She would like to return to work.  Exam:  Constitutional:  Alert, well nourished, NAD.  HEENT: Normocephalic, atraumatic.   Pulm:  Without shortness of breath   CV:  No pitting edema of BLE.      Neurological:  Awake  Alert  Oriented x 3  Motor exam:        IP Q DF PF EHL  R   5  5   5   5    5  L   5  5   5   5    5     Reflexes are 2+ in the patellar and Achilles. There is no clonus. Downgoing Babinski.    Reflexes are 2+ in the brachial radialis and triceps. Negative Jailene sign bilaterally.    Able to spontaneously move L/E bilaterally  Sensation intact throughout all L/E dermatomes     Incision: Healed nicely.  A/P: 6 weeks s/p left L3-4 foraminotomy. Doing well. Filled out paperwork allowing to return to work with 25 pound lifting restriction. Advised to followup in 6 weeks.  - Call the clinic at [phone] for increased pain or any other questions and concerns.      Yue Mayfield PA-C  [facility]  [address] [address]    Tel [phone]  Pager [phone]

## 2017-05-25 NOTE — PROGRESS NOTES
"Evelia Conner is a 56 year old female who presents for:  Chief Complaint   Patient presents with     Surgical Followup     foraminotomy lumbar DOS: 4/12/17        Initial Vitals:  Temp 97  F (36.1  C) (Temporal)  Ht 5' 2.5\" (1.588 m)  Wt 203 lb 4.8 oz (92.2 kg)  BMI 36.59 kg/m2 Estimated body mass index is 36.59 kg/(m^2) as calculated from the following:    Height as of this encounter: 5' 2.5\" (1.588 m).    Weight as of this encounter: 203 lb 4.8 oz (92.2 kg).. Body surface area is 2.02 meters squared. BP completed using cuff size: NA (Not Taken)  No Pain (0)    Do you feel safe in your environment?  Yes  Do you need any refills today? No    Nursing Comments:         Audra Horner CMA ([facility])      "

## 2017-05-25 NOTE — MR AVS SNAPSHOT
After Visit Summary   5/25/2017    Evelia Conner    MRN: [id number]           Patient Information     Date Of Birth          1960        Visit Information        Provider Department      5/25/2017 1:20 PM Yue Mayfield PA-C [facility]        Today's Diagnoses     S/P lumbar microdiscectomy    -  1       Follow-ups after your visit        Your next 10 appointments already scheduled     Jul 06, 2017  1:00 PM CDT   Return Visit with Yue Mayfield PA-C   [facility] ([facility])    [address] [place] [phone]              Who to contact     If you have questions or need follow up information about today's clinic visit or your schedule please contact [facility] directly at [phone].  Normal or non-critical lab and imaging results will be communicated to you by [employer], letter or phone within 4 business days after the clinic has received the results. If you do not hear from us within 7 days, please contact the clinic through [employer] or phone. If you have a critical or abnormal lab result, we will notify you by phone as soon as possible.  Submit refill requests through [employer] or call your pharmacy and they will forward the refill request to us. Please allow 3 business days for your refill to be completed.          Additional Information About Your Visit        MyChart Information     [employer] gives you secure access to your electronic health record. If you see a primary care provider, you can also send messages to your care team and make appointments. If you have questions, please call your primary care clinic.  If you do not have a primary care provider, please call [phone] and they will assist you.        Care EveryWhere ID     This is your Care EveryWhere ID. This could be used by other organizations to access your [facility] medical records  [id number]        Your Vitals Were      "Temperature Height BMI (Body Mass Index)             97  F (36.1  C) (Temporal) 1.588 m (5' 2.5\") 36.59 kg/m2          Blood Pressure from Last 3 Encounters:   04/12/17 110/63   03/20/17 126/83   03/10/17 126/82    Weight from Last 3 Encounters:   05/25/17 92.2 kg (203 lb 4.8 oz)   03/20/17 89.8 kg (198 lb)   02/22/17 89.4 kg (197 lb)              Today, you had the following     No orders found for display       Primary Care Provider Office Phone # Fax #    Jill Taylor [phone]       FAMILY [facility]  [address] [facility] [address]        Thank you!     Thank you for choosing [facility]  for your care. Our goal is always to provide you with excellent care. Hearing back from our patients is one way we can continue to improve our services. Please take a few minutes to complete the written survey that you may receive in the mail after your visit with us. Thank you!             Your Updated Medication List - Protect others around you: Learn how to safely use, store and throw away your medicines at www.disposemymeds.org.          This list is accurate as of: 5/25/17  3:02 PM.  Always use your most recent med list.                   Brand Name Dispense Instructions for use    HYDROCHLOROTHIAZIDE PO      Take 25 mg by mouth       montelukast 10 MG tablet    SINGULAIR     TK 1 T PO D IN THE PM       OMEPRAZOLE PO      Take 20 mg by mouth       TYLENOL PO      Take 500 mg by mouth every 6 hours as needed for mild pain or fever       vitamin D 47075 UNIT capsule    ERGOCALCIFEROL     TK 1 C PO Q WK       XOPENEX HFA 45 MCG/ACT Inhaler   Generic drug:  levalbuterol      INHALE 1 TO 2 PUFFS PO Q 4 TO 6 H PRN         "

## 2017-06-13 ENCOUNTER — MYC MEDICAL ADVICE (OUTPATIENT)
Dept: NEUROSURGERY | Facility: CLINIC | Age: 57
End: 2017-06-13

## 2017-06-13 DIAGNOSIS — Z98.890 S/P LUMBAR MICRODISCECTOMY: Primary | ICD-10-CM

## 2017-06-14 RX ORDER — METHYLPREDNISOLONE 4 MG
TABLET, DOSE PACK ORAL
Qty: 21 TABLET | Refills: 0 | Status: SHIPPED | OUTPATIENT
Start: 2017-06-14 | End: 2017-07-06

## 2017-06-14 NOTE — TELEPHONE ENCOUNTER
Received message from patient via [employer]. Patient reporting pain after returning to work with 25lb restriction. This past weekend she bent down and started having more pain. Yue Mayfield PA-C would recommend medrol dosepak, and alternate between ice and heat. If pain doesn't improve in one week, we can order MRI. Spoke with patient and she voiced agreement with this plan.

## 2017-06-23 ENCOUNTER — TELEPHONE (OUTPATIENT)
Dept: NEUROSURGERY | Facility: CLINIC | Age: 57
End: 2017-06-23

## 2017-06-23 DIAGNOSIS — Z98.890 S/P LUMBAR MICRODISCECTOMY: Primary | ICD-10-CM

## 2017-06-28 ENCOUNTER — HOSPITAL ENCOUNTER (OUTPATIENT)
Dept: MRI IMAGING | Facility: CLINIC | Age: 57
Discharge: HOME | End: 2017-06-28
Attending: PHYSICIAN ASSISTANT | Admitting: PHYSICIAN ASSISTANT
Payer: COMMERCIAL

## 2017-06-28 DIAGNOSIS — Z98.890 S/P LUMBAR MICRODISCECTOMY: ICD-10-CM

## 2017-06-28 PROCEDURE — A9585 GADOBUTROL INJECTION: HCPCS | Performed by: RADIOLOGY

## 2017-06-28 PROCEDURE — 25000128 H RX IP 250 OP 636: Performed by: RADIOLOGY

## 2017-06-28 PROCEDURE — 72158 MRI LUMBAR SPINE W/O & W/DYE: CPT

## 2017-06-28 RX ORDER — GADOBUTROL 604.72 MG/ML
10 INJECTION INTRAVENOUS ONCE
Status: COMPLETED | OUTPATIENT
Start: 2017-06-28 | End: 2017-06-28

## 2017-06-28 RX ADMIN — GADOBUTROL 10 ML: 604.72 INJECTION INTRAVENOUS at 14:28

## 2017-06-29 ENCOUNTER — TELEPHONE (OUTPATIENT)
Dept: NEUROSURGERY | Facility: CLINIC | Age: 57
End: 2017-06-29

## 2017-06-29 NOTE — TELEPHONE ENCOUNTER
Per Yue Mayfield PA-C, patient's recent lumbar MRI looks good. Informed patient she's likely experiencing post op pain, which will take time to resolve. LVM with patient to call back with questions. We will plan for routine f/u appt on 7/6/17.

## 2017-07-06 ENCOUNTER — OFFICE VISIT (OUTPATIENT)
Dept: NEUROSURGERY | Facility: CLINIC | Age: 57
End: 2017-07-06
Payer: COMMERCIAL

## 2017-07-06 VITALS — BODY MASS INDEX: 36.62 KG/M2 | HEIGHT: 63 IN | WEIGHT: 206.7 LBS | TEMPERATURE: 96.8 F

## 2017-07-06 DIAGNOSIS — Z98.890 S/P LUMBAR MICRODISCECTOMY: Primary | ICD-10-CM

## 2017-07-06 PROCEDURE — 99024 POSTOP FOLLOW-UP VISIT: CPT | Performed by: PHYSICIAN ASSISTANT

## 2017-07-06 ASSESSMENT — PAIN SCALES - GENERAL: PAINLEVEL: NO PAIN (0)

## 2017-07-06 NOTE — PROGRESS NOTES
"Evelia Conner is a 56 year old female who presents for:  Chief Complaint   Patient presents with     Surgical Followup     Foraminotomy Lumbar DOS: 4-12-17        Initial Vitals:  Temp 96.8  F (36  C) (Temporal)  Ht 1.588 m (5' 2.5\")  Wt 93.8 kg (206 lb 11.2 oz)  BMI 37.2 kg/m2 Estimated body mass index is 37.2 kg/(m^2) as calculated from the following:    Height as of this encounter: 1.588 m (5' 2.5\").    Weight as of this encounter: 93.8 kg (206 lb 11.2 oz).. Body surface area is 2.03 meters squared. BP completed using cuff size: NA (Not Taken)  No Pain (0)    Do you feel safe in your environment?  Yes  Do you need any refills today? No    Nursing Comments:       Audra Horner CMA ([facility])    "

## 2017-07-06 NOTE — MR AVS SNAPSHOT
"              After Visit Summary   7/6/2017    Evelia Conner    MRN: [id number]           Patient Information     Date Of Birth          1960        Visit Information        Provider Department      7/6/2017 1:00 PM Yue Mayfield PA-C [facility]        Today's Diagnoses     S/P lumbar microdiscectomy    -  1       Follow-ups after your visit        Who to contact     If you have questions or need follow up information about today's clinic visit or your schedule please contact [facility] directly at [phone].  Normal or non-critical lab and imaging results will be communicated to you by [email or web address], letter or phone within 4 business days after the clinic has received the results. If you do not hear from us within 7 days, please contact the clinic through [employer] or phone. If you have a critical or abnormal lab result, we will notify you by phone as soon as possible.  Submit refill requests through Goodybag or call your pharmacy and they will forward the refill request to us. Please allow 3 business days for your refill to be completed.          Additional Information About Your Visit        MyChart Information     Good[employer] gives you secure access to your electronic health record. If you see a primary care provider, you can also send messages to your care team and make appointments. If you have questions, please call your primary care clinic.  If you do not have a primary care provider, please call [phone] and they will assist you.        Care EveryWhere ID     This is your Care EveryWhere ID. This could be used by other organizations to access your [place] medical records  [id number]        Your Vitals Were     Temperature Height BMI (Body Mass Index)             96.8  F (36  C) (Temporal) 5' 2.5\" (1.588 m) 37.2 kg/m2          Blood Pressure from Last 3 Encounters:   04/12/17 110/63   03/20/17 126/83   03/10/17 126/82    Weight from Last 3 Encounters:   07/06/17 206 lb " 11.2 oz (93.8 kg)   05/25/17 203 lb 4.8 oz (92.2 kg)   03/20/17 198 lb (89.8 kg)              Today, you had the following     No orders found for display       Primary Care Provider Office Phone # Fax #    Jill Taylor [phone]       [facility]  [address] [facility] [address]        Equal Access to Services     LORNA NOLEN : Hadii aad ku hadasho Soomaali, waaxda luqadaha, qaybta kaalmada adeegyada, waxay idiin haycarlosn adekana angelagage bloom . So [facility] [phone].    ATENCIÓN: Si jose luis lópez, tiene a barry disposición servicios gratuitos de asistencia lingüística. Llame al [phone].    We comply with applicable federal civil rights laws and [place] laws. We do not discriminate on the basis of race, color, national origin, age, disability sex, sexual orientation or gender identity.            Thank you!     Thank you for choosing [facility]  for your care. Our goal is always to provide you with excellent care. Hearing back from our patients is one way we can continue to improve our services. Please take a few minutes to complete the written survey that you may receive in the mail after your visit with us. Thank you!             Your Updated Medication List - Protect others around you: Learn how to safely use, store and throw away your medicines at www.disposemymeds.org.          This list is accurate as of: 7/6/17  1:16 PM.  Always use your most recent med list.                   Brand Name Dispense Instructions for use Diagnosis    HYDROCHLOROTHIAZIDE PO      Take 25 mg by mouth        montelukast 10 MG tablet    SINGULAIR     TK 1 T PO D IN THE PM        OMEPRAZOLE PO      Take 20 mg by mouth        TYLENOL PO      Take 500 mg by mouth every 6 hours as needed for mild pain or fever        vitamin D 77622 UNIT capsule    ERGOCALCIFEROL     TK 1 C PO Q WK        XOPENEX HFA 45 MCG/ACT Inhaler   Generic drug:  levalbuterol      INHALE 1 TO 2 PUFFS PO Q 4 TO 6 H  PRN

## 2017-07-06 NOTE — PROGRESS NOTES
[facility]  Neurosurgery followup:    HPI: 3 months s/p left L3-4 foraminotomy. She states her radicular pain has resolved, but she now has some numbness and tingling down the left extremity. Her recent MRI is stable.   Exam:  Constitutional:  Alert, well nourished, NAD.  HEENT: Normocephalic, atraumatic.   Pulm:  Without shortness of breath   CV:  No pitting edema of BLE.      Neurological:  Awake  Alert  Oriented x 3  Motor exam:        IP Q DF PF EHL  R   5  5   5   5    5  L   5  5   5   5    5     Reflexes are 2+ in the patellar and Achilles. There is no clonus. Downgoing Babinski.    Reflexes are 2+ in the brachial radialis and triceps. Negative Jailene sign bilaterally.    Able to spontaneously move L/E bilaterally  Sensation intact throughout all L/E dermatomes    Imaging: Lumbar MRI from 6/28/2017 reveals interval left laminectomy, partial facetectomy and discectomy on the left at L3-4 with no recurrent disc herniation or stenosis.   Incision: Nicely healed  A/P: 3 months s/p left L3-4 foraminotomy. Radicular pain has resolved, but new onset numbness/tingling down left extremity has since onset. MRI from 6/28/2017 looks good. Advised patient this can be common post-operatively and that she can continue increasing activities. Follow-up as needed.  - Call the clinic at [phone] for increased pain or any other questions and concerns.      Yue Mayfield PA-C  [facility]  [address]  [address] [place] [id number]    Tel [phone]  Pager [phone]

## 2018-01-21 ENCOUNTER — HEALTH MAINTENANCE LETTER (OUTPATIENT)
Age: 58
End: 2018-01-21

## 2020-03-10 ENCOUNTER — HEALTH MAINTENANCE LETTER (OUTPATIENT)
Age: 60
End: 2020-03-10

## 2020-12-27 ENCOUNTER — HEALTH MAINTENANCE LETTER (OUTPATIENT)
Age: 60
End: 2020-12-27

## 2021-04-24 ENCOUNTER — HEALTH MAINTENANCE LETTER (OUTPATIENT)
Age: 61
End: 2021-04-24

## 2021-10-09 ENCOUNTER — HEALTH MAINTENANCE LETTER (OUTPATIENT)
Age: 61
End: 2021-10-09

## 2021-11-16 ENCOUNTER — TRANSFERRED RECORDS (OUTPATIENT)
Dept: HEALTH INFORMATION MANAGEMENT | Facility: CLINIC | Age: 61
End: 2021-11-16
Payer: COMMERCIAL

## 2022-03-26 ENCOUNTER — HEALTH MAINTENANCE LETTER (OUTPATIENT)
Age: 62
End: 2022-03-26

## 2022-05-21 ENCOUNTER — HEALTH MAINTENANCE LETTER (OUTPATIENT)
Age: 62
End: 2022-05-21

## 2022-09-11 ENCOUNTER — HEALTH MAINTENANCE LETTER (OUTPATIENT)
Age: 62
End: 2022-09-11

## 2023-06-03 ENCOUNTER — HEALTH MAINTENANCE LETTER (OUTPATIENT)
Age: 63
End: 2023-06-03

## 2024-01-29 PROCEDURE — 88305 TISSUE EXAM BY PATHOLOGIST: CPT | Mod: TC,ORL | Performed by: STUDENT IN AN ORGANIZED HEALTH CARE EDUCATION/TRAINING PROGRAM

## 2024-01-29 PROCEDURE — 88305 TISSUE EXAM BY PATHOLOGIST: CPT | Mod: 26 | Performed by: DERMATOLOGY

## 2024-01-30 ENCOUNTER — LAB REQUISITION (OUTPATIENT)
Dept: LAB | Facility: CLINIC | Age: 64
End: 2024-01-30
Payer: COMMERCIAL

## 2024-01-31 LAB
PATH REPORT.COMMENTS IMP SPEC: NORMAL
PATH REPORT.COMMENTS IMP SPEC: NORMAL
PATH REPORT.FINAL DX SPEC: NORMAL
PATH REPORT.GROSS SPEC: NORMAL
PATH REPORT.MICROSCOPIC SPEC OTHER STN: NORMAL
PATH REPORT.RELEVANT HX SPEC: NORMAL

## 2024-02-24 ENCOUNTER — HEALTH MAINTENANCE LETTER (OUTPATIENT)
Age: 64
End: 2024-02-24

## (undated) DEVICE — GOWN XLG DISP 9545

## (undated) DEVICE — PREP CHLORAPREP 26ML TINTED ORANGE  260815

## (undated) DEVICE — ESU ELEC BLADE 6" COATED E1450-6

## (undated) DEVICE — SPONGE COTTONOID 1/2X1/2" 80-1400

## (undated) DEVICE — DRSG ADAPTIC 3X3" 6112

## (undated) DEVICE — NDL ECLIPSE 22GA 1.5"

## (undated) DEVICE — SYR 20ML LL W/O NDL

## (undated) DEVICE — BONE WAX 2.5GM W31G

## (undated) DEVICE — GLOVE ESTEEM POWDER FREE 7.5  2D72PL75

## (undated) DEVICE — DRAPE MAYO STAND 23X54 8337

## (undated) DEVICE — DRAPE POUCH INSTRUMENT 1018

## (undated) DEVICE — DRSG PRIMAPORE 03 1/8X6" 66000318

## (undated) DEVICE — GLOVE ESTEEM BLUE W/NEU-THERA 8.5  2D73PB85

## (undated) DEVICE — NDL SPINAL 18GA 3.5" 405184

## (undated) DEVICE — DRAPE STERI TOWEL SM 1000

## (undated) DEVICE — BLADE CLIPPER 4406

## (undated) DEVICE — LABEL MEDICATION SYSTEM  3304

## (undated) DEVICE — STOCKING SLEEVE COMPRESSION CALF MED

## (undated) DEVICE — ADH LIQUID MASTISOL TOPICAL VIAL 2-3ML 0523-48

## (undated) DEVICE — BASIN SET MINOR DISP

## (undated) DEVICE — GOWN IMPERVIOUS BREATHABLE 2XL/XLONG

## (undated) DEVICE — SU MONOCRYL 4-0 PS-2 18" UND Y496G

## (undated) DEVICE — DISPOSABLE TUBULAR RETRACTOR

## (undated) DEVICE — GLOVE ESTEEM POWDER FREE 8.5  2D72PL85

## (undated) DEVICE — SOL ISOPROPYL ALCOHOL USP 70% 16OZ  NDC10565-002-16 D0022

## (undated) DEVICE — SOL WATER IRRIG 1000ML BOTTLE 07139-09

## (undated) DEVICE — Device

## (undated) DEVICE — INTRODUCER CATH TAUT 2.0MMX1.6MMX7.6CM PI-63

## (undated) DEVICE — DRAPE MICRO ZEISS OPMI 137X381CM 306070-0000-000

## (undated) DEVICE — DRSG STERI STRIP 1/2X4" R1547

## (undated) DEVICE — ADH FLOSEAL W/HUMAN THROMBIN 5ML 1501825

## (undated) DEVICE — SYR 03ML LL W/O NDL

## (undated) DEVICE — PEN MARKING SKIN

## (undated) DEVICE — SYR 10ML LL W/O NDL

## (undated) DEVICE — DRAPE C-ARM

## (undated) RX ORDER — PROPOFOL 10 MG/ML
INJECTION, EMULSION INTRAVENOUS
Status: DISPENSED
Start: 2017-04-12

## (undated) RX ORDER — ONDANSETRON 2 MG/ML
INJECTION INTRAMUSCULAR; INTRAVENOUS
Status: DISPENSED
Start: 2017-04-12

## (undated) RX ORDER — DEXAMETHASONE SODIUM PHOSPHATE 10 MG/ML
INJECTION INTRAMUSCULAR; INTRAVENOUS
Status: DISPENSED
Start: 2017-04-12

## (undated) RX ORDER — METHYLPREDNISOLONE ACETATE 40 MG/ML
INJECTION, SUSPENSION INTRA-ARTICULAR; INTRALESIONAL; INTRAMUSCULAR; SOFT TISSUE
Status: DISPENSED
Start: 2017-04-12

## (undated) RX ORDER — CEFAZOLIN SODIUM 1 G/3ML
INJECTION, POWDER, FOR SOLUTION INTRAMUSCULAR; INTRAVENOUS
Status: DISPENSED
Start: 2017-04-12

## (undated) RX ORDER — PHENYLEPHRINE HCL IN 0.9% NACL 1 MG/10 ML
SYRINGE (ML) INTRAVENOUS
Status: DISPENSED
Start: 2017-04-12

## (undated) RX ORDER — FENTANYL CITRATE 50 UG/ML
INJECTION, SOLUTION INTRAMUSCULAR; INTRAVENOUS
Status: DISPENSED
Start: 2017-04-12

## (undated) RX ORDER — GLYCOPYRROLATE 0.2 MG/ML
INJECTION INTRAMUSCULAR; INTRAVENOUS
Status: DISPENSED
Start: 2017-04-12

## (undated) RX ORDER — BUPIVACAINE HYDROCHLORIDE AND EPINEPHRINE 5; 5 MG/ML; UG/ML
INJECTION, SOLUTION EPIDURAL; INTRACAUDAL; PERINEURAL
Status: DISPENSED
Start: 2017-04-12

## (undated) RX ORDER — ACETAMINOPHEN 10 MG/ML
INJECTION, SOLUTION INTRAVENOUS
Status: DISPENSED
Start: 2017-04-12

## (undated) RX ORDER — EPHEDRINE SULFATE 50 MG/ML
INJECTION, SOLUTION INTRAMUSCULAR; INTRAVENOUS; SUBCUTANEOUS
Status: DISPENSED
Start: 2017-04-12